# Patient Record
Sex: FEMALE | Race: WHITE | NOT HISPANIC OR LATINO
[De-identification: names, ages, dates, MRNs, and addresses within clinical notes are randomized per-mention and may not be internally consistent; named-entity substitution may affect disease eponyms.]

---

## 2017-01-23 ENCOUNTER — APPOINTMENT (OUTPATIENT)
Dept: OBGYN | Facility: CLINIC | Age: 27
End: 2017-01-23

## 2017-02-24 ENCOUNTER — ASOB RESULT (OUTPATIENT)
Age: 27
End: 2017-02-24

## 2017-02-24 ENCOUNTER — APPOINTMENT (OUTPATIENT)
Dept: ANTEPARTUM | Facility: CLINIC | Age: 27
End: 2017-02-24

## 2017-02-27 ENCOUNTER — APPOINTMENT (OUTPATIENT)
Dept: OBGYN | Facility: CLINIC | Age: 27
End: 2017-02-27

## 2017-03-21 ENCOUNTER — APPOINTMENT (OUTPATIENT)
Dept: OBGYN | Facility: CLINIC | Age: 27
End: 2017-03-21

## 2017-04-11 ENCOUNTER — APPOINTMENT (OUTPATIENT)
Dept: OBGYN | Facility: CLINIC | Age: 27
End: 2017-04-11

## 2017-05-02 ENCOUNTER — APPOINTMENT (OUTPATIENT)
Dept: OBGYN | Facility: CLINIC | Age: 27
End: 2017-05-02

## 2017-05-17 ENCOUNTER — APPOINTMENT (OUTPATIENT)
Dept: OBGYN | Facility: CLINIC | Age: 27
End: 2017-05-17

## 2017-05-30 ENCOUNTER — APPOINTMENT (OUTPATIENT)
Dept: OBGYN | Facility: CLINIC | Age: 27
End: 2017-05-30

## 2017-06-13 ENCOUNTER — APPOINTMENT (OUTPATIENT)
Dept: OBGYN | Facility: CLINIC | Age: 27
End: 2017-06-13

## 2017-06-20 ENCOUNTER — APPOINTMENT (OUTPATIENT)
Dept: OBGYN | Facility: CLINIC | Age: 27
End: 2017-06-20

## 2017-06-27 ENCOUNTER — APPOINTMENT (OUTPATIENT)
Dept: OBGYN | Facility: CLINIC | Age: 27
End: 2017-06-27

## 2017-07-03 ENCOUNTER — APPOINTMENT (OUTPATIENT)
Dept: OBGYN | Facility: CLINIC | Age: 27
End: 2017-07-03

## 2017-07-06 ENCOUNTER — OUTPATIENT (OUTPATIENT)
Dept: OUTPATIENT SERVICES | Facility: HOSPITAL | Age: 27
LOS: 1 days | End: 2017-07-06
Payer: COMMERCIAL

## 2017-07-06 DIAGNOSIS — O34.212 MATERNAL CARE FOR VERTICAL SCAR FROM PREVIOUS CESAREAN DELIVERY: ICD-10-CM

## 2017-07-06 DIAGNOSIS — Z01.818 ENCOUNTER FOR OTHER PREPROCEDURAL EXAMINATION: ICD-10-CM

## 2017-07-06 LAB
HCT VFR BLD CALC: 36.4 % — SIGNIFICANT CHANGE UP (ref 34.5–45)
HGB BLD-MCNC: 12.1 G/DL — SIGNIFICANT CHANGE UP (ref 11.5–15.5)
MCHC RBC-ENTMCNC: 29.7 PG — SIGNIFICANT CHANGE UP (ref 27–34)
MCHC RBC-ENTMCNC: 33.2 GM/DL — SIGNIFICANT CHANGE UP (ref 32–36)
MCV RBC AUTO: 89.4 FL — SIGNIFICANT CHANGE UP (ref 80–100)
PLATELET # BLD AUTO: 184 K/UL — SIGNIFICANT CHANGE UP (ref 150–400)
RBC # BLD: 4.07 M/UL — SIGNIFICANT CHANGE UP (ref 3.8–5.2)
RBC # FLD: 14.3 % — SIGNIFICANT CHANGE UP (ref 10.3–14.5)
WBC # BLD: 9.18 K/UL — SIGNIFICANT CHANGE UP (ref 3.8–10.5)
WBC # FLD AUTO: 9.18 K/UL — SIGNIFICANT CHANGE UP (ref 3.8–10.5)

## 2017-07-06 PROCEDURE — 86901 BLOOD TYPING SEROLOGIC RH(D): CPT

## 2017-07-06 PROCEDURE — 86900 BLOOD TYPING SEROLOGIC ABO: CPT

## 2017-07-06 PROCEDURE — G0463: CPT

## 2017-07-06 PROCEDURE — 85027 COMPLETE CBC AUTOMATED: CPT

## 2017-07-06 PROCEDURE — 86850 RBC ANTIBODY SCREEN: CPT

## 2017-07-07 ENCOUNTER — TRANSCRIPTION ENCOUNTER (OUTPATIENT)
Age: 27
End: 2017-07-07

## 2017-07-07 ENCOUNTER — INPATIENT (INPATIENT)
Facility: HOSPITAL | Age: 27
LOS: 2 days | Discharge: HOME | End: 2017-07-10
Attending: OBSTETRICS & GYNECOLOGY | Admitting: OBSTETRICS & GYNECOLOGY
Payer: COMMERCIAL

## 2017-07-07 VITALS
TEMPERATURE: 98 F | SYSTOLIC BLOOD PRESSURE: 106 MMHG | RESPIRATION RATE: 20 BRPM | HEART RATE: 90 BPM | OXYGEN SATURATION: 98 % | DIASTOLIC BLOOD PRESSURE: 69 MMHG

## 2017-07-07 DIAGNOSIS — O34.212 MATERNAL CARE FOR VERTICAL SCAR FROM PREVIOUS CESAREAN DELIVERY: ICD-10-CM

## 2017-07-07 LAB
BASOPHILS # BLD AUTO: 0 K/UL — SIGNIFICANT CHANGE UP (ref 0–0.2)
BASOPHILS # BLD AUTO: 0.1 K/UL — SIGNIFICANT CHANGE UP (ref 0–0.2)
BASOPHILS NFR BLD AUTO: 0.1 % — SIGNIFICANT CHANGE UP (ref 0–2)
BASOPHILS NFR BLD AUTO: 0.5 % — SIGNIFICANT CHANGE UP (ref 0–2)
EOSINOPHIL # BLD AUTO: 0 K/UL — SIGNIFICANT CHANGE UP (ref 0–0.5)
EOSINOPHIL # BLD AUTO: 0.1 K/UL — SIGNIFICANT CHANGE UP (ref 0–0.5)
EOSINOPHIL NFR BLD AUTO: 0.2 % — SIGNIFICANT CHANGE UP (ref 0–6)
EOSINOPHIL NFR BLD AUTO: 0.8 % — SIGNIFICANT CHANGE UP (ref 0–6)
HCT VFR BLD CALC: 34.1 % — LOW (ref 34.5–45)
HCT VFR BLD CALC: 35.1 % — SIGNIFICANT CHANGE UP (ref 34.5–45)
HGB BLD-MCNC: 11.8 G/DL — SIGNIFICANT CHANGE UP (ref 11.5–15.5)
HGB BLD-MCNC: 12.2 G/DL — SIGNIFICANT CHANGE UP (ref 11.5–15.5)
LYMPHOCYTES # BLD AUTO: 1.7 K/UL — SIGNIFICANT CHANGE UP (ref 1–3.3)
LYMPHOCYTES # BLD AUTO: 1.9 K/UL — SIGNIFICANT CHANGE UP (ref 1–3.3)
LYMPHOCYTES # BLD AUTO: 11.6 % — LOW (ref 13–44)
LYMPHOCYTES # BLD AUTO: 20.1 % — SIGNIFICANT CHANGE UP (ref 13–44)
MCHC RBC-ENTMCNC: 31.9 PG — SIGNIFICANT CHANGE UP (ref 27–34)
MCHC RBC-ENTMCNC: 31.9 PG — SIGNIFICANT CHANGE UP (ref 27–34)
MCHC RBC-ENTMCNC: 34.7 GM/DL — SIGNIFICANT CHANGE UP (ref 32–36)
MCHC RBC-ENTMCNC: 34.9 GM/DL — SIGNIFICANT CHANGE UP (ref 32–36)
MCV RBC AUTO: 91.5 FL — SIGNIFICANT CHANGE UP (ref 80–100)
MCV RBC AUTO: 92 FL — SIGNIFICANT CHANGE UP (ref 80–100)
MONOCYTES # BLD AUTO: 1 K/UL — HIGH (ref 0–0.9)
MONOCYTES # BLD AUTO: 1.1 K/UL — HIGH (ref 0–0.9)
MONOCYTES NFR BLD AUTO: 10.8 % — SIGNIFICANT CHANGE UP (ref 2–14)
MONOCYTES NFR BLD AUTO: 7.6 % — SIGNIFICANT CHANGE UP (ref 2–14)
NEUTROPHILS # BLD AUTO: 11.7 K/UL — HIGH (ref 1.8–7.4)
NEUTROPHILS # BLD AUTO: 6.4 K/UL — SIGNIFICANT CHANGE UP (ref 1.8–7.4)
NEUTROPHILS NFR BLD AUTO: 67.8 % — SIGNIFICANT CHANGE UP (ref 43–77)
NEUTROPHILS NFR BLD AUTO: 80.5 % — HIGH (ref 43–77)
PLATELET # BLD AUTO: 154 K/UL — SIGNIFICANT CHANGE UP (ref 150–400)
PLATELET # BLD AUTO: 173 K/UL — SIGNIFICANT CHANGE UP (ref 150–400)
RBC # BLD: 3.71 M/UL — LOW (ref 3.8–5.2)
RBC # BLD: 3.83 M/UL — SIGNIFICANT CHANGE UP (ref 3.8–5.2)
RBC # FLD: 12.9 % — SIGNIFICANT CHANGE UP (ref 10.3–14.5)
RBC # FLD: 13.1 % — SIGNIFICANT CHANGE UP (ref 10.3–14.5)
T PALLIDUM AB TITR SER: NEGATIVE — SIGNIFICANT CHANGE UP
WBC # BLD: 14.5 K/UL — HIGH (ref 3.8–10.5)
WBC # BLD: 9.4 K/UL — SIGNIFICANT CHANGE UP (ref 3.8–10.5)
WBC # FLD AUTO: 14.5 K/UL — HIGH (ref 3.8–10.5)
WBC # FLD AUTO: 9.4 K/UL — SIGNIFICANT CHANGE UP (ref 3.8–10.5)

## 2017-07-07 PROCEDURE — 59510 CESAREAN DELIVERY: CPT

## 2017-07-07 RX ORDER — OXYCODONE HYDROCHLORIDE 5 MG/1
5 TABLET ORAL EVERY 4 HOURS
Qty: 0 | Refills: 0 | Status: DISCONTINUED | OUTPATIENT
Start: 2017-07-07 | End: 2017-07-10

## 2017-07-07 RX ORDER — FERROUS SULFATE 325(65) MG
325 TABLET ORAL DAILY
Qty: 0 | Refills: 0 | Status: DISCONTINUED | OUTPATIENT
Start: 2017-07-07 | End: 2017-07-10

## 2017-07-07 RX ORDER — CEFAZOLIN SODIUM 1 G
2000 VIAL (EA) INJECTION ONCE
Qty: 0 | Refills: 0 | Status: COMPLETED | OUTPATIENT
Start: 2017-07-07 | End: 2017-07-07

## 2017-07-07 RX ORDER — DIPHENHYDRAMINE HCL 50 MG
25 CAPSULE ORAL EVERY 6 HOURS
Qty: 0 | Refills: 0 | Status: DISCONTINUED | OUTPATIENT
Start: 2017-07-07 | End: 2017-07-10

## 2017-07-07 RX ORDER — SODIUM CHLORIDE 9 MG/ML
1000 INJECTION, SOLUTION INTRAVENOUS
Qty: 0 | Refills: 0 | Status: DISCONTINUED | OUTPATIENT
Start: 2017-07-07 | End: 2017-07-07

## 2017-07-07 RX ORDER — DOCUSATE SODIUM 100 MG
100 CAPSULE ORAL
Qty: 0 | Refills: 0 | Status: DISCONTINUED | OUTPATIENT
Start: 2017-07-07 | End: 2017-07-10

## 2017-07-07 RX ORDER — SIMETHICONE 80 MG/1
80 TABLET, CHEWABLE ORAL EVERY 4 HOURS
Qty: 0 | Refills: 0 | Status: DISCONTINUED | OUTPATIENT
Start: 2017-07-07 | End: 2017-07-10

## 2017-07-07 RX ORDER — SODIUM CHLORIDE 9 MG/ML
1000 INJECTION, SOLUTION INTRAVENOUS
Qty: 0 | Refills: 0 | Status: DISCONTINUED | OUTPATIENT
Start: 2017-07-07 | End: 2017-07-10

## 2017-07-07 RX ORDER — OXYTOCIN 10 UNIT/ML
41.67 VIAL (ML) INJECTION
Qty: 20 | Refills: 0 | Status: DISCONTINUED | OUTPATIENT
Start: 2017-07-07 | End: 2017-07-10

## 2017-07-07 RX ORDER — METOCLOPRAMIDE HCL 10 MG
10 TABLET ORAL ONCE
Qty: 0 | Refills: 0 | Status: DISCONTINUED | OUTPATIENT
Start: 2017-07-07 | End: 2017-07-07

## 2017-07-07 RX ORDER — KETOROLAC TROMETHAMINE 30 MG/ML
30 SYRINGE (ML) INJECTION EVERY 6 HOURS
Qty: 0 | Refills: 0 | Status: DISCONTINUED | OUTPATIENT
Start: 2017-07-07 | End: 2017-07-10

## 2017-07-07 RX ORDER — HYDROMORPHONE HYDROCHLORIDE 2 MG/ML
0.5 INJECTION INTRAMUSCULAR; INTRAVENOUS; SUBCUTANEOUS
Qty: 0 | Refills: 0 | Status: DISCONTINUED | OUTPATIENT
Start: 2017-07-07 | End: 2017-07-07

## 2017-07-07 RX ORDER — GLYCERIN ADULT
1 SUPPOSITORY, RECTAL RECTAL AT BEDTIME
Qty: 0 | Refills: 0 | Status: DISCONTINUED | OUTPATIENT
Start: 2017-07-07 | End: 2017-07-10

## 2017-07-07 RX ORDER — HEPARIN SODIUM 5000 [USP'U]/ML
5000 INJECTION INTRAVENOUS; SUBCUTANEOUS EVERY 12 HOURS
Qty: 0 | Refills: 0 | Status: DISCONTINUED | OUTPATIENT
Start: 2017-07-07 | End: 2017-07-10

## 2017-07-07 RX ORDER — NALOXONE HYDROCHLORIDE 4 MG/.1ML
0.1 SPRAY NASAL
Qty: 0 | Refills: 0 | Status: DISCONTINUED | OUTPATIENT
Start: 2017-07-07 | End: 2017-07-09

## 2017-07-07 RX ORDER — CITRIC ACID/SODIUM CITRATE 300-500 MG
15 SOLUTION, ORAL ORAL ONCE
Qty: 0 | Refills: 0 | Status: COMPLETED | OUTPATIENT
Start: 2017-07-07 | End: 2017-07-07

## 2017-07-07 RX ORDER — FAMOTIDINE 10 MG/ML
20 INJECTION INTRAVENOUS ONCE
Qty: 0 | Refills: 0 | Status: COMPLETED | OUTPATIENT
Start: 2017-07-07 | End: 2017-07-07

## 2017-07-07 RX ORDER — OXYTOCIN 10 UNIT/ML
333.33 VIAL (ML) INJECTION
Qty: 20 | Refills: 0 | Status: DISCONTINUED | OUTPATIENT
Start: 2017-07-07 | End: 2017-07-10

## 2017-07-07 RX ORDER — LANOLIN
1 OINTMENT (GRAM) TOPICAL
Qty: 0 | Refills: 0 | Status: DISCONTINUED | OUTPATIENT
Start: 2017-07-07 | End: 2017-07-10

## 2017-07-07 RX ORDER — SODIUM CHLORIDE 9 MG/ML
1000 INJECTION, SOLUTION INTRAVENOUS ONCE
Qty: 0 | Refills: 0 | Status: COMPLETED | OUTPATIENT
Start: 2017-07-07 | End: 2017-07-07

## 2017-07-07 RX ORDER — IBUPROFEN 200 MG
600 TABLET ORAL EVERY 6 HOURS
Qty: 0 | Refills: 0 | Status: COMPLETED | OUTPATIENT
Start: 2017-07-07 | End: 2018-06-05

## 2017-07-07 RX ORDER — DEXAMETHASONE 0.5 MG/5ML
4 ELIXIR ORAL EVERY 6 HOURS
Qty: 0 | Refills: 0 | Status: DISCONTINUED | OUTPATIENT
Start: 2017-07-07 | End: 2017-07-09

## 2017-07-07 RX ORDER — TETANUS TOXOID, REDUCED DIPHTHERIA TOXOID AND ACELLULAR PERTUSSIS VACCINE, ADSORBED 5; 2.5; 8; 8; 2.5 [IU]/.5ML; [IU]/.5ML; UG/.5ML; UG/.5ML; UG/.5ML
0.5 SUSPENSION INTRAMUSCULAR ONCE
Qty: 0 | Refills: 0 | Status: COMPLETED | OUTPATIENT
Start: 2017-07-07

## 2017-07-07 RX ORDER — CITRIC ACID/SODIUM CITRATE 300-500 MG
30 SOLUTION, ORAL ORAL ONCE
Qty: 0 | Refills: 0 | Status: DISCONTINUED | OUTPATIENT
Start: 2017-07-07 | End: 2017-07-07

## 2017-07-07 RX ORDER — OXYCODONE HYDROCHLORIDE 5 MG/1
5 TABLET ORAL
Qty: 0 | Refills: 0 | Status: COMPLETED | OUTPATIENT
Start: 2017-07-07 | End: 2017-07-14

## 2017-07-07 RX ORDER — ONDANSETRON 8 MG/1
4 TABLET, FILM COATED ORAL EVERY 6 HOURS
Qty: 0 | Refills: 0 | Status: DISCONTINUED | OUTPATIENT
Start: 2017-07-07 | End: 2017-07-09

## 2017-07-07 RX ORDER — ACETAMINOPHEN 500 MG
975 TABLET ORAL EVERY 6 HOURS
Qty: 0 | Refills: 0 | Status: COMPLETED | OUTPATIENT
Start: 2017-07-07 | End: 2018-06-05

## 2017-07-07 RX ADMIN — Medication 15 MILLILITER(S): at 09:13

## 2017-07-07 RX ADMIN — Medication 30 MILLIGRAM(S): at 20:43

## 2017-07-07 RX ADMIN — SODIUM CHLORIDE 125 MILLILITER(S): 9 INJECTION, SOLUTION INTRAVENOUS at 20:47

## 2017-07-07 RX ADMIN — SODIUM CHLORIDE 125 MILLILITER(S): 9 INJECTION, SOLUTION INTRAVENOUS at 14:00

## 2017-07-07 RX ADMIN — FAMOTIDINE 20 MILLIGRAM(S): 10 INJECTION INTRAVENOUS at 09:12

## 2017-07-07 RX ADMIN — SODIUM CHLORIDE 2000 MILLILITER(S): 9 INJECTION, SOLUTION INTRAVENOUS at 09:13

## 2017-07-07 RX ADMIN — HEPARIN SODIUM 5000 UNIT(S): 5000 INJECTION INTRAVENOUS; SUBCUTANEOUS at 20:44

## 2017-07-07 RX ADMIN — Medication 30 MILLIGRAM(S): at 21:15

## 2017-07-08 LAB
HCT VFR BLD CALC: 30.1 % — LOW (ref 34.5–45)
HGB BLD-MCNC: 9.9 G/DL — LOW (ref 11.5–15.5)
MCHC RBC-ENTMCNC: 29 PG — SIGNIFICANT CHANGE UP (ref 27–34)
MCHC RBC-ENTMCNC: 32.9 GM/DL — SIGNIFICANT CHANGE UP (ref 32–36)
MCV RBC AUTO: 88.3 FL — SIGNIFICANT CHANGE UP (ref 80–100)
PLATELET # BLD AUTO: 166 K/UL — SIGNIFICANT CHANGE UP (ref 150–400)
RBC # BLD: 3.41 M/UL — LOW (ref 3.8–5.2)
RBC # FLD: 14.5 % — SIGNIFICANT CHANGE UP (ref 10.3–14.5)
WBC # BLD: 10.17 K/UL — SIGNIFICANT CHANGE UP (ref 3.8–10.5)
WBC # FLD AUTO: 10.17 K/UL — SIGNIFICANT CHANGE UP (ref 3.8–10.5)

## 2017-07-08 RX ORDER — ACETAMINOPHEN 500 MG
975 TABLET ORAL EVERY 6 HOURS
Qty: 0 | Refills: 0 | Status: DISCONTINUED | OUTPATIENT
Start: 2017-07-08 | End: 2017-07-10

## 2017-07-08 RX ADMIN — Medication 30 MILLIGRAM(S): at 18:42

## 2017-07-08 RX ADMIN — Medication 30 MILLIGRAM(S): at 05:15

## 2017-07-08 RX ADMIN — SIMETHICONE 80 MILLIGRAM(S): 80 TABLET, CHEWABLE ORAL at 01:59

## 2017-07-08 RX ADMIN — HEPARIN SODIUM 5000 UNIT(S): 5000 INJECTION INTRAVENOUS; SUBCUTANEOUS at 12:49

## 2017-07-08 RX ADMIN — Medication 30 MILLIGRAM(S): at 04:45

## 2017-07-08 RX ADMIN — SODIUM CHLORIDE 125 MILLILITER(S): 9 INJECTION, SOLUTION INTRAVENOUS at 04:45

## 2017-07-08 RX ADMIN — Medication 30 MILLIGRAM(S): at 13:00

## 2017-07-08 RX ADMIN — Medication 30 MILLIGRAM(S): at 19:15

## 2017-07-08 RX ADMIN — Medication 30 MILLIGRAM(S): at 13:30

## 2017-07-08 RX ADMIN — HEPARIN SODIUM 5000 UNIT(S): 5000 INJECTION INTRAVENOUS; SUBCUTANEOUS at 21:01

## 2017-07-08 RX ADMIN — Medication 1 TABLET(S): at 13:08

## 2017-07-08 RX ADMIN — Medication 325 MILLIGRAM(S): at 13:08

## 2017-07-08 NOTE — PROGRESS NOTE ADULT - SUBJECTIVE AND OBJECTIVE BOX
Day _1__ of Anesthesia Pain Management Service    SUBJECTIVE:  Pain Scale Score	At rest: ___2 	With Activity: ___ 	[  ] Refer to charted pain scores    THERAPY:  [ ] Epidural Bupivacaine 0.0625% and Hydromorphone 10 micrograms/mL  [ ] Epidural Ropivacaine 0.2% plain   [x ] Epidural Bupivacaine 0.01 % and Fentanyl 3 micrograms/mL  (OB)    Demand dose __3_ lockout ___15 (minutes) Continuous Rate 10___       MEDICATIONS  (STANDING):  fentaNYL (3 MICROgram(s)/mL) + BUpivacaine 0.01% in 0.9% Sodium Chloride PCEA 250 milliLiter(s) Epidural PCA Continuous  oxytocin Infusion 333.333 milliUNIT(s)/Min (1000 mL/Hr) IV Continuous <Continuous>  oxytocin Infusion 41.667 milliUNIT(s)/Min (125 mL/Hr) IV Continuous <Continuous>  lactated ringers. 1000 milliLiter(s) (125 mL/Hr) IV Continuous <Continuous>  diphtheria/tetanus/pertussis (acellular) Vaccine (ADAcel) 0.5 milliLiter(s) IntraMuscular once  oxytocin Infusion 41.667 milliUNIT(s)/Min (125 mL/Hr) IV Continuous <Continuous>  ferrous    sulfate 325 milliGRAM(s) Oral daily  prenatal multivitamin 1 Tablet(s) Oral daily  ketorolac   Injectable 30 milliGRAM(s) IV Push every 6 hours  oxyCODONE    IR 5 milliGRAM(s) Oral every 3 hours  ibuprofen  Tablet 600 milliGRAM(s) Oral every 6 hours  heparin  Injectable 5000 Unit(s) SubCutaneous every 12 hours  acetaminophen   Tablet. 975 milliGRAM(s) Oral every 6 hours    MEDICATIONS  (PRN):  fentaNYL (3 MICROgram(s)/mL) + BUpivacaine 0.01% in 0.9% Sodium Chloride PCEA Rescue Clinician Bolus 5 milliLiter(s) Epidural every 15 minutes PRN Moderate Pain (4 - 6)  naloxone Injectable 0.1 milliGRAM(s) IV Push every 3 minutes PRN For ANY of the following changes in patient status:  A. RR LESS THAN 10 breaths per minute, B. Oxygen saturation LESS THAN 90%, C. Sedation score of 6  ondansetron Injectable 4 milliGRAM(s) IV Push every 6 hours PRN Nausea  dexamethasone  Injectable 4 milliGRAM(s) IV Push every 6 hours PRN Nausea, IF ondansetron is ineffective after 30 - 60 minutes  simethicone 80 milliGRAM(s) Chew every 4 hours PRN Gas  diphenhydrAMINE   Capsule 25 milliGRAM(s) Oral every 6 hours PRN Itching  glycerin Suppository - Adult 1 Suppository(s) Rectal at bedtime PRN Constipation  docusate sodium 100 milliGRAM(s) Oral two times a day PRN Stool Softening  lanolin Ointment 1 Application(s) Topical every 3 hours PRN Sore Nipples  oxyCODONE    IR 5 milliGRAM(s) Oral every 4 hours PRN Severe Pain (7 - 10)      OBJECTIVE:    Assessment of Epidural Catheter Site: 	    [ x] Dressing intact	[x ] Site non-tender	[x ] Site without erythema, discharge, edema  [ x] Epidural tubing and connection checked	[x [ Gross neurological exam within normal limits  [ x] Catheter removed – tip intact		                          11.8   14.5  )-----------( 154      ( 07 Jul 2017 14:15 )             34.1     Vital Signs Last 24 Hrs  T(C): 36.8 (07-08-17 @ 02:01), Max: 36.8 (07-08-17 @ 02:01)  T(F): 98.3 (07-08-17 @ 02:01), Max: 98.3 (07-08-17 @ 02:01)  HR: 83 (07-08-17 @ 02:01) (63 - 92)  BP: 92/58 (07-08-17 @ 02:01) (92/58 - 109/59)  BP(mean): 75 (07-07-17 @ 22:00) (66 - 80)  RR: 18 (07-08-17 @ 02:01) (14 - 20)  SpO2: 98% (07-07-17 @ 22:00) (96% - 100%)      Sedation Score:	[x ] Alert	[ ] Drowsy	[ ] Arousable  [ ] Asleep  [ ] Unresponsive    Side Effects:	[x ] None	[ ] Nausea	[ ] Vomiting  [ ] Pruritus  		[ ] Weakness  [ ] Numbness  [ ] Other:    ASSESSMENT/ PLAN:    Therapy to  be:	[x ] Continue   [ ] Discontinued   [ ] Change to prn Analgesics    Documentation and Verification of current medications:  [ X ] Done	[ ] Not done, not eligible, reason:    Comments:

## 2017-07-08 NOTE — PROGRESS NOTE ADULT - SUBJECTIVE AND OBJECTIVE BOX
Patient seen and examined at bedside, no acute overnight events. No acute complaints, pain well controlled with PCEA. Patient is ambulating and tolerating regular diet. Has not yet passed flatus. Pt is breast feeding her baby.    Vital Signs Last 24 Hours  T(C): 36.8 (07-08-17 @ 06:38), Max: 36.8 (07-08-17 @ 02:01)  HR: 78 (07-08-17 @ 06:38) (63 - 92)  BP: 100/62 (07-08-17 @ 06:38) (92/58 - 109/59)  RR: 18 (07-08-17 @ 06:38) (14 - 20)  SpO2: 98% (07-07-17 @ 22:00) (96% - 100%)    I&O's Summary    07 Jul 2017 07:01  -  08 Jul 2017 07:00  --------------------------------------------------------  IN: 2970 mL / OUT: 4925 mL / NET: -1955 mL        Physical Exam:  General: NAD  Abdomen: Soft, non-tender, non-distended, fundus firm  Incision: Pfannenstiel incision CDI, subcuticular suture closure  Pelvic: Lochia wnl    Labs:    Blood Type: A Positive  RPR: Negative               11.8   14.5  )-----------( 154      ( 07-07 @ 14:15 )             34.1                12.2   9.4   )-----------( 173      ( 07-07 @ 09:14 )             35.1                12.1   9.18  )-----------( 184      ( 07-06 @ 16:13 )             36.4         MEDICATIONS  (STANDING):  fentaNYL (3 MICROgram(s)/mL) + BUpivacaine 0.01% in 0.9% Sodium Chloride PCEA 250 milliLiter(s) Epidural PCA Continuous  oxytocin Infusion 333.333 milliUNIT(s)/Min (1000 mL/Hr) IV Continuous <Continuous>  oxytocin Infusion 41.667 milliUNIT(s)/Min (125 mL/Hr) IV Continuous <Continuous>  lactated ringers. 1000 milliLiter(s) (125 mL/Hr) IV Continuous <Continuous>  diphtheria/tetanus/pertussis (acellular) Vaccine (ADAcel) 0.5 milliLiter(s) IntraMuscular once  oxytocin Infusion 41.667 milliUNIT(s)/Min (125 mL/Hr) IV Continuous <Continuous>  ferrous    sulfate 325 milliGRAM(s) Oral daily  prenatal multivitamin 1 Tablet(s) Oral daily  ketorolac   Injectable 30 milliGRAM(s) IV Push every 6 hours  oxyCODONE    IR 5 milliGRAM(s) Oral every 3 hours  ibuprofen  Tablet 600 milliGRAM(s) Oral every 6 hours  heparin  Injectable 5000 Unit(s) SubCutaneous every 12 hours  acetaminophen   Tablet. 975 milliGRAM(s) Oral every 6 hours    MEDICATIONS  (PRN):  fentaNYL (3 MICROgram(s)/mL) + BUpivacaine 0.01% in 0.9% Sodium Chloride PCEA Rescue Clinician Bolus 5 milliLiter(s) Epidural every 15 minutes PRN Moderate Pain (4 - 6)  naloxone Injectable 0.1 milliGRAM(s) IV Push every 3 minutes PRN For ANY of the following changes in patient status:  A. RR LESS THAN 10 breaths per minute, B. Oxygen saturation LESS THAN 90%, C. Sedation score of 6  ondansetron Injectable 4 milliGRAM(s) IV Push every 6 hours PRN Nausea  dexamethasone  Injectable 4 milliGRAM(s) IV Push every 6 hours PRN Nausea, IF ondansetron is ineffective after 30 - 60 minutes  simethicone 80 milliGRAM(s) Chew every 4 hours PRN Gas  diphenhydrAMINE   Capsule 25 milliGRAM(s) Oral every 6 hours PRN Itching  glycerin Suppository - Adult 1 Suppository(s) Rectal at bedtime PRN Constipation  docusate sodium 100 milliGRAM(s) Oral two times a day PRN Stool Softening  lanolin Ointment 1 Application(s) Topical every 3 hours PRN Sore Nipples  oxyCODONE    IR 5 milliGRAM(s) Oral every 4 hours PRN Severe Pain (7 - 10)

## 2017-07-08 NOTE — PROGRESS NOTE ADULT - PROBLEM SELECTOR PLAN 1
- Continue with PCEA  - Increase ambulation  - Continue regular diet  - Renew IV fluids  - Check CBC  - D/c Chambers  - Incision dressing removed

## 2017-07-09 RX ORDER — TETANUS TOXOID, REDUCED DIPHTHERIA TOXOID AND ACELLULAR PERTUSSIS VACCINE, ADSORBED 5; 2.5; 8; 8; 2.5 [IU]/.5ML; [IU]/.5ML; UG/.5ML; UG/.5ML; UG/.5ML
0.5 SUSPENSION INTRAMUSCULAR ONCE
Qty: 0 | Refills: 0 | Status: COMPLETED | OUTPATIENT
Start: 2017-07-09 | End: 2017-07-09

## 2017-07-09 RX ORDER — IBUPROFEN 200 MG
600 TABLET ORAL EVERY 6 HOURS
Qty: 0 | Refills: 0 | Status: DISCONTINUED | OUTPATIENT
Start: 2017-07-09 | End: 2017-07-10

## 2017-07-09 RX ORDER — OXYCODONE HYDROCHLORIDE 5 MG/1
5 TABLET ORAL
Qty: 0 | Refills: 0 | Status: DISCONTINUED | OUTPATIENT
Start: 2017-07-09 | End: 2017-07-10

## 2017-07-09 RX ADMIN — HEPARIN SODIUM 5000 UNIT(S): 5000 INJECTION INTRAVENOUS; SUBCUTANEOUS at 10:48

## 2017-07-09 RX ADMIN — Medication 600 MILLIGRAM(S): at 14:00

## 2017-07-09 RX ADMIN — Medication 30 MILLIGRAM(S): at 06:50

## 2017-07-09 RX ADMIN — HEPARIN SODIUM 5000 UNIT(S): 5000 INJECTION INTRAVENOUS; SUBCUTANEOUS at 22:37

## 2017-07-09 RX ADMIN — Medication 975 MILLIGRAM(S): at 18:01

## 2017-07-09 RX ADMIN — Medication 600 MILLIGRAM(S): at 13:30

## 2017-07-09 RX ADMIN — Medication 975 MILLIGRAM(S): at 13:03

## 2017-07-09 RX ADMIN — SODIUM CHLORIDE 125 MILLILITER(S): 9 INJECTION, SOLUTION INTRAVENOUS at 00:29

## 2017-07-09 RX ADMIN — Medication 1 TABLET(S): at 13:03

## 2017-07-09 RX ADMIN — Medication 975 MILLIGRAM(S): at 13:26

## 2017-07-09 RX ADMIN — Medication 600 MILLIGRAM(S): at 23:15

## 2017-07-09 RX ADMIN — Medication 30 MILLIGRAM(S): at 06:18

## 2017-07-09 RX ADMIN — Medication 600 MILLIGRAM(S): at 22:40

## 2017-07-09 RX ADMIN — Medication 325 MILLIGRAM(S): at 13:03

## 2017-07-09 RX ADMIN — Medication 30 MILLIGRAM(S): at 01:00

## 2017-07-09 RX ADMIN — Medication 975 MILLIGRAM(S): at 18:19

## 2017-07-09 RX ADMIN — Medication 30 MILLIGRAM(S): at 00:28

## 2017-07-09 RX ADMIN — TETANUS TOXOID, REDUCED DIPHTHERIA TOXOID AND ACELLULAR PERTUSSIS VACCINE, ADSORBED 0.5 MILLILITER(S): 5; 2.5; 8; 8; 2.5 SUSPENSION INTRAMUSCULAR at 20:17

## 2017-07-09 NOTE — PROGRESS NOTE ADULT - ATTENDING COMMENTS
Pt seen and examined. Agree with above note. POD#2 s/p CS, doing well. Routine post op/PP care.     RENÉE Rodrigez

## 2017-07-09 NOTE — PROGRESS NOTE ADULT - PROBLEM SELECTOR PLAN 1
- increase out of bed and ambulation  - analgesia PRN  - d/c PCEA, advance to PO pain meds w tylenol/motrin/oxycodone  - continue regular diet    Gianna Khan, PGY1

## 2017-07-09 NOTE — PROGRESS NOTE ADULT - SUBJECTIVE AND OBJECTIVE BOX
Postpartum Note,  Section  She is a 27y woman who is now post-operative day: 2    Subjective:  The patient feels well.  She is ambulating.   She is tolerating regular diet.  She denies nausea and vomiting.  She is voiding.  Her pain is controlled.  She reports normal postpartum bleeding.  She is breastfeeding.    Physical exam:    Vital Signs Last 24 Hrs  T(C): 36.7 (2017 05:34), Max: 37 (2017 21:08)  T(F): 98 (2017 05:34), Max: 98.6 (2017 21:08)  HR: 91 (2017 05:34) (66 - 102)  BP: 101/65 (2017 05:34) (99/63 - 126/77)  BP(mean): --  RR: 18 (2017 05:34) (18 - 18)  SpO2: 97% (2017 13:00) (97% - 98%)    Gen: NAD  Breast: Soft, nontender, not engorged.  Abdomen: Soft, nontender, no distension , firm uterine fundus at umbilicus.  Incision: Clean, dry, and intact with steri strips  Pelvic: Normal lochia noted  Ext: No calf tenderness    LABS:                        9.9    10.17 )-----------( 166      ( 2017 08:35 )             30.1                         11.8   14.5  )-----------( 154      ( 2017 14:15 )             34.1                         12.2   9.4   )-----------( 173      ( 2017 09:14 )             35.1         Allergies    No Known Allergies    Intolerances      MEDICATIONS  (STANDING):  oxytocin Infusion 333.333 milliUNIT(s)/Min (1000 mL/Hr) IV Continuous <Continuous>  oxytocin Infusion 41.667 milliUNIT(s)/Min (125 mL/Hr) IV Continuous <Continuous>  lactated ringers. 1000 milliLiter(s) (125 mL/Hr) IV Continuous <Continuous>  diphtheria/tetanus/pertussis (acellular) Vaccine (ADAcel) 0.5 milliLiter(s) IntraMuscular once  oxytocin Infusion 41.667 milliUNIT(s)/Min (125 mL/Hr) IV Continuous <Continuous>  ferrous    sulfate 325 milliGRAM(s) Oral daily  prenatal multivitamin 1 Tablet(s) Oral daily  ketorolac   Injectable 30 milliGRAM(s) IV Push every 6 hours  oxyCODONE    IR 5 milliGRAM(s) Oral every 3 hours  ibuprofen  Tablet 600 milliGRAM(s) Oral every 6 hours  heparin  Injectable 5000 Unit(s) SubCutaneous every 12 hours  acetaminophen   Tablet. 975 milliGRAM(s) Oral every 6 hours    MEDICATIONS  (PRN):  simethicone 80 milliGRAM(s) Chew every 4 hours PRN Gas  diphenhydrAMINE   Capsule 25 milliGRAM(s) Oral every 6 hours PRN Itching  glycerin Suppository - Adult 1 Suppository(s) Rectal at bedtime PRN Constipation  docusate sodium 100 milliGRAM(s) Oral two times a day PRN Stool Softening  lanolin Ointment 1 Application(s) Topical every 3 hours PRN Sore Nipples  oxyCODONE    IR 5 milliGRAM(s) Oral every 4 hours PRN Severe Pain (7 - 10)

## 2017-07-09 NOTE — PROGRESS NOTE ADULT - SUBJECTIVE AND OBJECTIVE BOX
Day 2___ of Anesthesia Pain Management Service    SUBJECTIVE:  Pain Scale Score	At rest: _2__ 	With Activity: ___ 	[  ] Refer to charted pain scores    THERAPY:  [ ] Epidural Bupivacaine 0.0625% and Hydromorphone 10 micrograms/mL  [ ] Epidural Ropivacaine 0.2% plain   [ x] Epidural Bupivacaine 0.01 % and Fentanyl 3 micrograms/mL  (OB)    Demand dose __3_ lockout __15_ (minutes) Continuous Rate _10__       MEDICATIONS  (STANDING):  fentaNYL (3 MICROgram(s)/mL) + BUpivacaine 0.01% in 0.9% Sodium Chloride PCEA 250 milliLiter(s) Epidural PCA Continuous  oxytocin Infusion 333.333 milliUNIT(s)/Min (1000 mL/Hr) IV Continuous <Continuous>  oxytocin Infusion 41.667 milliUNIT(s)/Min (125 mL/Hr) IV Continuous <Continuous>  lactated ringers. 1000 milliLiter(s) (125 mL/Hr) IV Continuous <Continuous>  diphtheria/tetanus/pertussis (acellular) Vaccine (ADAcel) 0.5 milliLiter(s) IntraMuscular once  oxytocin Infusion 41.667 milliUNIT(s)/Min (125 mL/Hr) IV Continuous <Continuous>  ferrous    sulfate 325 milliGRAM(s) Oral daily  prenatal multivitamin 1 Tablet(s) Oral daily  ketorolac   Injectable 30 milliGRAM(s) IV Push every 6 hours  oxyCODONE    IR 5 milliGRAM(s) Oral every 3 hours  ibuprofen  Tablet 600 milliGRAM(s) Oral every 6 hours  heparin  Injectable 5000 Unit(s) SubCutaneous every 12 hours  acetaminophen   Tablet. 975 milliGRAM(s) Oral every 6 hours    MEDICATIONS  (PRN):  fentaNYL (3 MICROgram(s)/mL) + BUpivacaine 0.01% in 0.9% Sodium Chloride PCEA Rescue Clinician Bolus 5 milliLiter(s) Epidural every 15 minutes PRN Moderate Pain (4 - 6)  naloxone Injectable 0.1 milliGRAM(s) IV Push every 3 minutes PRN For ANY of the following changes in patient status:  A. RR LESS THAN 10 breaths per minute, B. Oxygen saturation LESS THAN 90%, C. Sedation score of 6  ondansetron Injectable 4 milliGRAM(s) IV Push every 6 hours PRN Nausea  dexamethasone  Injectable 4 milliGRAM(s) IV Push every 6 hours PRN Nausea, IF ondansetron is ineffective after 30 - 60 minutes  simethicone 80 milliGRAM(s) Chew every 4 hours PRN Gas  diphenhydrAMINE   Capsule 25 milliGRAM(s) Oral every 6 hours PRN Itching  glycerin Suppository - Adult 1 Suppository(s) Rectal at bedtime PRN Constipation  docusate sodium 100 milliGRAM(s) Oral two times a day PRN Stool Softening  lanolin Ointment 1 Application(s) Topical every 3 hours PRN Sore Nipples  oxyCODONE    IR 5 milliGRAM(s) Oral every 4 hours PRN Severe Pain (7 - 10)      OBJECTIVE:    Assessment of Epidural Catheter Site: 	    [x ] Dressing intact	[x ] Site non-tender	[x ] Site without erythema, discharge, edema  [x ] Epidural tubing and connection checked	[x [ Gross neurological exam within normal limits  [ x] Catheter removed – tip intact		                          9.9    10.17 )-----------( 166      ( 08 Jul 2017 08:35 )             30.1     Vital Signs Last 24 Hrs  T(C): 36.7 (07-09-17 @ 05:34), Max: 37 (07-08-17 @ 21:08)  T(F): 98 (07-09-17 @ 05:34), Max: 98.6 (07-08-17 @ 21:08)  HR: 91 (07-09-17 @ 05:34) (66 - 102)  BP: 101/65 (07-09-17 @ 05:34) (99/63 - 126/77)  BP(mean): --  RR: 18 (07-09-17 @ 05:34) (18 - 18)  SpO2: 97% (07-08-17 @ 13:00) (97% - 98%)      Sedation Score:	[x ] Alert	[ ] Drowsy	[ ] Arousable  [ ] Asleep  [ ] Unresponsive    Side Effects:	[x ] None	[ ] Nausea	[ ] Vomiting  [ ] Pruritus  		[ ] Weakness  [ ] Numbness  [ ] Other:    ASSESSMENT/ PLAN:    Therapy to  be:	[ ] Continue   [ ] Discontinued   [x ] Change to prn Analgesics    Documentation and Verification of current medications:  [ X ] Done	[ ] Not done, not eligible, reason:    Comments:

## 2017-07-10 ENCOUNTER — TRANSCRIPTION ENCOUNTER (OUTPATIENT)
Age: 27
End: 2017-07-10

## 2017-07-10 VITALS
SYSTOLIC BLOOD PRESSURE: 104 MMHG | DIASTOLIC BLOOD PRESSURE: 70 MMHG | HEART RATE: 84 BPM | RESPIRATION RATE: 18 BRPM | TEMPERATURE: 98 F

## 2017-07-10 LAB
BASOPHILS # BLD AUTO: 0.02 K/UL — SIGNIFICANT CHANGE UP (ref 0–0.2)
BASOPHILS NFR BLD AUTO: 0.3 % — SIGNIFICANT CHANGE UP (ref 0–2)
EOSINOPHIL # BLD AUTO: 0.19 K/UL — SIGNIFICANT CHANGE UP (ref 0–0.5)
EOSINOPHIL NFR BLD AUTO: 2.4 % — SIGNIFICANT CHANGE UP (ref 0–6)
HCT VFR BLD CALC: 29.9 % — LOW (ref 34.5–45)
HGB BLD-MCNC: 10 G/DL — LOW (ref 11.5–15.5)
IMM GRANULOCYTES NFR BLD AUTO: 0.4 % — SIGNIFICANT CHANGE UP (ref 0–1.5)
LYMPHOCYTES # BLD AUTO: 2.39 K/UL — SIGNIFICANT CHANGE UP (ref 1–3.3)
LYMPHOCYTES # BLD AUTO: 30.7 % — SIGNIFICANT CHANGE UP (ref 13–44)
MCHC RBC-ENTMCNC: 29.9 PG — SIGNIFICANT CHANGE UP (ref 27–34)
MCHC RBC-ENTMCNC: 33.4 GM/DL — SIGNIFICANT CHANGE UP (ref 32–36)
MCV RBC AUTO: 89.3 FL — SIGNIFICANT CHANGE UP (ref 80–100)
MONOCYTES # BLD AUTO: 0.78 K/UL — SIGNIFICANT CHANGE UP (ref 0–0.9)
MONOCYTES NFR BLD AUTO: 10 % — SIGNIFICANT CHANGE UP (ref 2–14)
NEUTROPHILS # BLD AUTO: 4.38 K/UL — SIGNIFICANT CHANGE UP (ref 1.8–7.4)
NEUTROPHILS NFR BLD AUTO: 56.2 % — SIGNIFICANT CHANGE UP (ref 43–77)
PLATELET # BLD AUTO: 175 K/UL — SIGNIFICANT CHANGE UP (ref 150–400)
RBC # BLD: 3.35 M/UL — LOW (ref 3.8–5.2)
RBC # FLD: 14.3 % — SIGNIFICANT CHANGE UP (ref 10.3–14.5)
WBC # BLD: 7.79 K/UL — SIGNIFICANT CHANGE UP (ref 3.8–10.5)
WBC # FLD AUTO: 7.79 K/UL — SIGNIFICANT CHANGE UP (ref 3.8–10.5)

## 2017-07-10 PROCEDURE — 86780 TREPONEMA PALLIDUM: CPT

## 2017-07-10 PROCEDURE — 85027 COMPLETE CBC AUTOMATED: CPT

## 2017-07-10 PROCEDURE — 59050 FETAL MONITOR W/REPORT: CPT

## 2017-07-10 PROCEDURE — 59025 FETAL NON-STRESS TEST: CPT

## 2017-07-10 PROCEDURE — 90715 TDAP VACCINE 7 YRS/> IM: CPT

## 2017-07-10 RX ORDER — DOCUSATE SODIUM 100 MG
1 CAPSULE ORAL
Qty: 0 | Refills: 0 | DISCHARGE
Start: 2017-07-10

## 2017-07-10 RX ORDER — IBUPROFEN 200 MG
1 TABLET ORAL
Qty: 0 | Refills: 0 | DISCHARGE
Start: 2017-07-10

## 2017-07-10 RX ORDER — ACETAMINOPHEN 500 MG
3 TABLET ORAL
Qty: 0 | Refills: 0 | DISCHARGE
Start: 2017-07-10

## 2017-07-10 RX ORDER — SIMETHICONE 80 MG/1
1 TABLET, CHEWABLE ORAL
Qty: 0 | Refills: 0 | COMMUNITY
Start: 2017-07-10

## 2017-07-10 NOTE — DISCHARGE NOTE OB - MATERIALS PROVIDED
[facility] Hearing Screen Program/  Immunization Record/Breastfeeding Guide and Packet/Birth Certificate Instructions/Shaken Baby Prevention Handout/[facility]  Screening Program/Vaccinations/Breastfeeding Mother’s Support Group Information/Guide to Postpartum Care/Breastfeeding Log/Bottle Feeding Log/Discharge Medication Information for Patients and Families Pocket Guide/Back To Sleep Handout

## 2017-07-10 NOTE — DISCHARGE NOTE OB - MEDICATION SUMMARY - MEDICATIONS TO TAKE
I will START or STAY ON the medications listed below when I get home from the hospital:    acetaminophen 325 mg oral tablet  -- 3 tab(s) by mouth every 6 hours  -- Indication: For mild pain    ibuprofen 600 mg oral tablet  -- 1 tab(s) by mouth every 6 hours  -- Indication: For moderate pain    Prenatal Multivitamins oral capsule  --  by mouth   -- Indication: For nutrition    docusate sodium 100 mg oral capsule  -- 1 cap(s) by mouth 2 times a day, As needed, Stool Softening  -- Indication: For stool softener    simethicone 80 mg oral tablet, chewable  -- 1 tab(s) by mouth every 4 hours, As needed, Gas  -- Indication: For gas pain

## 2017-07-10 NOTE — DISCHARGE NOTE OB - CARE PROVIDER_API CALL
Ayah Uriostegui (MD), OBSGYN  [address] [place] [address]  Phone: [phone]  Fax: [phone]

## 2017-07-10 NOTE — PROGRESS NOTE ADULT - SUBJECTIVE AND OBJECTIVE BOX
Postpartum Note,  Section  She is a  27y woman who is now post-operative day: 3    Subjective:  The patient feels well.  She is ambulating.   She is tolerating regular diet.  She denies nausea and vomiting.  She is voiding, having bowel movements.  Her pain is controlled.  She reports normal postpartum bleeding.  She is breastfeeding.      Physical exam:    Vital Signs Last 24 Hrs  T(C): 36.7 (10 Jul 2017 06:08), Max: 36.7 (10 Jul 2017 06:08)  T(F): 98.1 (10 Jul 2017 06:08), Max: 98.1 (10 Jul 2017 06:08)  HR: 84 (10 Jul 2017 06:08) (77 - 87)  BP: 104/70 (10 Jul 2017 06:08) (104/70 - 118/73)  BP(mean): --  RR: 18 (10 Jul 2017 06:08) (18 - 18)  SpO2: --    Gen: NAD  Breast: Soft, nontender, not engorged.  Abdomen: Soft, nontender, no distension , firm uterine fundus at umbilicus.  Incision: Clean, dry, and intact with steri strips  Pelvic: Normal lochia noted  Ext: No calf tenderness    LABS:                        10.0   7.79  )-----------( 175      ( 10 Jul 2017 07:12 )             29.9                         9.9    10.17 )-----------( 166      ( 2017 08:35 )             30.1               Allergies    No Known Allergies    Intolerances      MEDICATIONS  (STANDING):  oxytocin Infusion 333.333 milliUNIT(s)/Min (1000 mL/Hr) IV Continuous <Continuous>  oxytocin Infusion 41.667 milliUNIT(s)/Min (125 mL/Hr) IV Continuous <Continuous>  lactated ringers. 1000 milliLiter(s) (125 mL/Hr) IV Continuous <Continuous>  oxytocin Infusion 41.667 milliUNIT(s)/Min (125 mL/Hr) IV Continuous <Continuous>  ferrous    sulfate 325 milliGRAM(s) Oral daily  prenatal multivitamin 1 Tablet(s) Oral daily  ketorolac   Injectable 30 milliGRAM(s) IV Push every 6 hours  heparin  Injectable 5000 Unit(s) SubCutaneous every 12 hours  acetaminophen   Tablet. 975 milliGRAM(s) Oral every 6 hours  ibuprofen  Tablet 600 milliGRAM(s) Oral every 6 hours  oxyCODONE    IR 5 milliGRAM(s) Oral every 3 hours    MEDICATIONS  (PRN):  simethicone 80 milliGRAM(s) Chew every 4 hours PRN Gas  diphenhydrAMINE   Capsule 25 milliGRAM(s) Oral every 6 hours PRN Itching  glycerin Suppository - Adult 1 Suppository(s) Rectal at bedtime PRN Constipation  docusate sodium 100 milliGRAM(s) Oral two times a day PRN Stool Softening  lanolin Ointment 1 Application(s) Topical every 3 hours PRN Sore Nipples  oxyCODONE    IR 5 milliGRAM(s) Oral every 4 hours PRN Severe Pain (7 - 10)

## 2017-07-10 NOTE — DISCHARGE NOTE OB - CARE PLAN
Principal Discharge DX:	 delivery delivered  Goal:	recover from surgery  Instructions for follow-up, activity and diet:	no heavy lifting or strenuous exercise, nothing the in the vagina, follow up in office at scheduled appointment in 2 weeks

## 2017-07-10 NOTE — DISCHARGE NOTE OB - PATIENT PORTAL LINK FT
“You can access the [employer] Patient Portal, offered by [facility], by registering with the following website: [email or web address]”

## 2017-07-10 NOTE — PROGRESS NOTE ADULT - PROBLEM SELECTOR PLAN 1
- increase out of bed and ambulation  - analgesia prn  - PO pain meds w tylenol/motrin/oxycodone  - continue regular diet  - CBC wnl  - discharge planned for today    Gianna Khan, PGY1

## 2017-07-10 NOTE — PROGRESS NOTE ADULT - ATTENDING COMMENTS
28yo P2 s/p repeat LTCS POD3, I have seen and examined the patient and agree with above assessment and plan, discharge home today

## 2017-07-15 ENCOUNTER — EMERGENCY (EMERGENCY)
Facility: HOSPITAL | Age: 27
LOS: 1 days | Discharge: HOME | End: 2017-07-15
Attending: EMERGENCY MEDICINE
Payer: COMMERCIAL

## 2017-07-15 VITALS
DIASTOLIC BLOOD PRESSURE: 83 MMHG | HEART RATE: 74 BPM | SYSTOLIC BLOOD PRESSURE: 113 MMHG | RESPIRATION RATE: 18 BRPM | OXYGEN SATURATION: 98 % | TEMPERATURE: 98 F

## 2017-07-15 VITALS
DIASTOLIC BLOOD PRESSURE: 76 MMHG | TEMPERATURE: 98 F | RESPIRATION RATE: 18 BRPM | HEART RATE: 73 BPM | SYSTOLIC BLOOD PRESSURE: 119 MMHG | OXYGEN SATURATION: 100 %

## 2017-07-15 DIAGNOSIS — Z98.891 HISTORY OF UTERINE SCAR FROM PREVIOUS SURGERY: Chronic | ICD-10-CM

## 2017-07-15 PROCEDURE — 99284 EMERGENCY DEPT VISIT MOD MDM: CPT | Mod: 25

## 2017-07-15 NOTE — ED PROVIDER NOTE - ATTENDING CONTRIBUTION TO CARE
Attending MD Huff:  I personally have seen and examined this patient.  Resident note reviewed and agree on plan of care and except where noted.  See MDM for details.

## 2017-07-15 NOTE — ED PROVIDER NOTE - NS ED ROS FT
Constitutional: No fever or chills  Eyes: No visual changes  HEENT: No throat pain  CV: No chest pain  Resp: No SOB no cough  GI: No abd pain, nausea or vomiting  : No dysuria  MSK: No musculoskeletal pain  Skin: As per hpi  Neuro: No headache

## 2017-07-15 NOTE — ED PROVIDER NOTE - OBJECTIVE STATEMENT
27F with no past medical history presents with painful mass in L inguinal area 7d status post .  Patient had normal pregnancy and  delivery (OBGYN Dr. Alyson Gutierrez) with normal post op course.  Yesterday, developed burning, 1x1cm subcutaneous mass and is concerned for clot in L inguinal area.  Denies CP, shortness of breath, h/o vte, abdominal pain, surgical scar pain/dc/bleeding/dehiscence, fever, chills, vaginal pain, vaginal bleeding, vaginal discharge.

## 2017-07-15 NOTE — ED PROVIDER NOTE - PHYSICAL EXAMINATION
***GEN - well appearing; NAD   ***HEAD - NC/AT  ***EYES/NOSE - PEERL, EOMI, mucous membranes moist, no discharge   ***THROAT: Oral cavity and pharynx normal. No inflammation, swelling, exudate, or lesions.    ***NECK: supple, non-tender no lymphadenopathy  ***PULMONARY - CTA b/l, symmetric breath sounds.   ***CARDIAC- s1s2, RRR, no murmur  ***ABDOMEN - +BS, ND, NT, soft, no guarding, no rebound, no organomegaly  ***BACK - no CVA tenderness, Normal  spine   ***EXTREMITIES - symmetric pulses, 2+ dp, capillary refill < 2 seconds, no clubbing, no cyanosis, no edema   ***SKIN - warm, dry, intact, no rash or bruising.  1x1cm firm subcutaneous mass in L inguinal area with mild TTP, no overlying erythema, warmth, ecchymosis, pulse  ***NEUROLOGIC - a&o x3, CN 2-12 intact, sensation nl, motor 5/5 RUE/LUE/RLE/LLE gait nl,

## 2017-07-15 NOTE — ED PROVIDER NOTE - CARE PLAN
Principal Discharge DX:	Superficial vein thrombosis  Instructions for follow-up, activity and diet:	1) Take currently prescribed medications as directed.    2) Follow up with your primary doctor and OBGYN for further evaluation and to answer any questions you have.    3) Return to the emergency department if you experience worsening symptoms, pain, fever, chills, nausea, vomiting or other concerning symptoms.

## 2017-07-15 NOTE — ED ADULT NURSE NOTE - OBJECTIVE STATEMENT
27 y.o F arrived to ED c/o painful mass in L inguinal region. A&Ox3. pt had  7 days ago. ; pregnancy and  uncomplicated, has been recovering well. Denies vaginal bleed/discharge/ pain. No pain/burning upon urination. Respirations nonlabored, O2 sat 100% RA; neuro intact, moves all extremities. Denies CP, SOB, N/V/D, fevers, chills, HA, dizziness, vision changes. Safety and comfort provided/maintained.

## 2017-07-15 NOTE — ED ADULT NURSE NOTE - DISCHARGE TEACHING
Pt instructed to followup with PMD in 24-48 hours; instructed to followup with OBGYN; verbalized understanding.

## 2017-07-15 NOTE — ED PROVIDER NOTE - PLAN OF CARE
1) Take currently prescribed medications as directed.    2) Follow up with your primary doctor and OBGYN for further evaluation and to answer any questions you have.    3) Return to the emergency department if you experience worsening symptoms, pain, fever, chills, nausea, vomiting or other concerning symptoms.

## 2017-07-15 NOTE — ED PROVIDER NOTE - MEDICAL DECISION MAKING DETAILS
Will obtain POCUS to evaluate for superificial thrombus Will obtain POCUS to evaluate for superificial thrombus    Daria GOMEZ: 28 y/o female one month post partum in the ER after noticing a small mass in her L inguinal area associated with a small superficial vein. On exam mass is nontender and mobile and not firm. POCUS was used to show that the mass has venous flow and is compressible and most likely represents thrombophlebitis. Patient educated and reassured and asked to follow up with PMD in 5 days.

## 2017-07-21 ENCOUNTER — APPOINTMENT (OUTPATIENT)
Dept: OBGYN | Facility: CLINIC | Age: 27
End: 2017-07-21

## 2017-08-15 ENCOUNTER — APPOINTMENT (OUTPATIENT)
Dept: OBGYN | Facility: CLINIC | Age: 27
End: 2017-08-15
Payer: COMMERCIAL

## 2017-08-15 PROCEDURE — 0503F POSTPARTUM CARE VISIT: CPT

## 2017-10-09 ENCOUNTER — APPOINTMENT (OUTPATIENT)
Dept: OBGYN | Facility: CLINIC | Age: 27
End: 2017-10-09
Payer: COMMERCIAL

## 2017-10-09 ENCOUNTER — RESULT REVIEW (OUTPATIENT)
Age: 27
End: 2017-10-09

## 2017-10-09 PROCEDURE — 36415 COLL VENOUS BLD VENIPUNCTURE: CPT

## 2017-10-09 PROCEDURE — 99395 PREV VISIT EST AGE 18-39: CPT

## 2018-03-27 ENCOUNTER — TRANSCRIPTION ENCOUNTER (OUTPATIENT)
Age: 28
End: 2018-03-27

## 2018-05-16 ENCOUNTER — TRANSCRIPTION ENCOUNTER (OUTPATIENT)
Age: 28
End: 2018-05-16

## 2018-05-31 ENCOUNTER — APPOINTMENT (OUTPATIENT)
Dept: DERMATOLOGY | Facility: CLINIC | Age: 28
End: 2018-05-31
Payer: COMMERCIAL

## 2018-05-31 VITALS
BODY MASS INDEX: 29.99 KG/M2 | DIASTOLIC BLOOD PRESSURE: 72 MMHG | WEIGHT: 180 LBS | HEIGHT: 65 IN | SYSTOLIC BLOOD PRESSURE: 120 MMHG

## 2018-05-31 DIAGNOSIS — Z86.69 PERSONAL HISTORY OF OTHER DISEASES OF THE NERVOUS SYSTEM AND SENSE ORGANS: ICD-10-CM

## 2018-05-31 DIAGNOSIS — Z91.89 OTHER SPECIFIED PERSONAL RISK FACTORS, NOT ELSEWHERE CLASSIFIED: ICD-10-CM

## 2018-05-31 DIAGNOSIS — D22.9 MELANOCYTIC NEVI, UNSPECIFIED: ICD-10-CM

## 2018-05-31 PROCEDURE — 99203 OFFICE O/P NEW LOW 30 MIN: CPT

## 2018-06-12 ENCOUNTER — APPOINTMENT (OUTPATIENT)
Dept: DERMATOLOGY | Facility: CLINIC | Age: 28
End: 2018-06-12

## 2018-10-12 ENCOUNTER — TRANSCRIPTION ENCOUNTER (OUTPATIENT)
Age: 28
End: 2018-10-12

## 2018-11-13 ENCOUNTER — RESULT REVIEW (OUTPATIENT)
Age: 28
End: 2018-11-13

## 2018-11-13 ENCOUNTER — APPOINTMENT (OUTPATIENT)
Dept: OBGYN | Facility: CLINIC | Age: 28
End: 2018-11-13
Payer: COMMERCIAL

## 2018-11-13 PROCEDURE — 90471 IMMUNIZATION ADMIN: CPT

## 2018-11-13 PROCEDURE — 99213 OFFICE O/P EST LOW 20 MIN: CPT | Mod: 25

## 2018-11-13 PROCEDURE — 36415 COLL VENOUS BLD VENIPUNCTURE: CPT

## 2018-11-13 PROCEDURE — 81025 URINE PREGNANCY TEST: CPT

## 2018-11-13 PROCEDURE — 76817 TRANSVAGINAL US OBSTETRIC: CPT

## 2018-11-13 PROCEDURE — 90686 IIV4 VACC NO PRSV 0.5 ML IM: CPT

## 2018-12-05 ENCOUNTER — APPOINTMENT (OUTPATIENT)
Dept: OBGYN | Facility: CLINIC | Age: 28
End: 2018-12-05
Payer: COMMERCIAL

## 2018-12-05 PROCEDURE — 76817 TRANSVAGINAL US OBSTETRIC: CPT

## 2018-12-05 PROCEDURE — 0500F INITIAL PRENATAL CARE VISIT: CPT

## 2018-12-17 ENCOUNTER — EMERGENCY (EMERGENCY)
Facility: HOSPITAL | Age: 28
LOS: 1 days | Discharge: HOME | End: 2018-12-17
Attending: EMERGENCY MEDICINE | Admitting: EMERGENCY MEDICINE
Payer: COMMERCIAL

## 2018-12-17 VITALS
SYSTOLIC BLOOD PRESSURE: 122 MMHG | OXYGEN SATURATION: 100 % | HEART RATE: 113 BPM | TEMPERATURE: 97 F | DIASTOLIC BLOOD PRESSURE: 67 MMHG | RESPIRATION RATE: 18 BRPM

## 2018-12-17 VITALS — HEART RATE: 99 BPM | SYSTOLIC BLOOD PRESSURE: 103 MMHG | TEMPERATURE: 98 F | DIASTOLIC BLOOD PRESSURE: 61 MMHG

## 2018-12-17 DIAGNOSIS — Z98.891 HISTORY OF UTERINE SCAR FROM PREVIOUS SURGERY: Chronic | ICD-10-CM

## 2018-12-17 LAB
ALBUMIN SERPL ELPH-MCNC: 4.2 G/DL — SIGNIFICANT CHANGE UP (ref 3.3–5)
ALP SERPL-CCNC: 50 U/L — SIGNIFICANT CHANGE UP (ref 40–120)
ALT FLD-CCNC: 12 U/L — SIGNIFICANT CHANGE UP (ref 4–33)
APPEARANCE UR: SIGNIFICANT CHANGE UP
AST SERPL-CCNC: 19 U/L — SIGNIFICANT CHANGE UP (ref 4–32)
BACTERIA # UR AUTO: NEGATIVE — SIGNIFICANT CHANGE UP
BASE EXCESS BLDV CALC-SCNC: -1.8 MMOL/L — SIGNIFICANT CHANGE UP
BASOPHILS # BLD AUTO: 0.01 K/UL — SIGNIFICANT CHANGE UP (ref 0–0.2)
BASOPHILS NFR BLD AUTO: 0.1 % — SIGNIFICANT CHANGE UP (ref 0–2)
BILIRUB SERPL-MCNC: 0.5 MG/DL — SIGNIFICANT CHANGE UP (ref 0.2–1.2)
BILIRUB UR-MCNC: NEGATIVE — SIGNIFICANT CHANGE UP
BLOOD GAS VENOUS - CREATININE: < 0.36 MG/DL — LOW (ref 0.5–1.3)
BLOOD UR QL VISUAL: NEGATIVE — SIGNIFICANT CHANGE UP
BUN SERPL-MCNC: 11 MG/DL — SIGNIFICANT CHANGE UP (ref 7–23)
CALCIUM SERPL-MCNC: 9.2 MG/DL — SIGNIFICANT CHANGE UP (ref 8.4–10.5)
CHLORIDE BLDV-SCNC: 110 MMOL/L — HIGH (ref 96–108)
CHLORIDE SERPL-SCNC: 102 MMOL/L — SIGNIFICANT CHANGE UP (ref 98–107)
CO2 SERPL-SCNC: 17 MMOL/L — LOW (ref 22–31)
COLOR SPEC: YELLOW — SIGNIFICANT CHANGE UP
CREAT SERPL-MCNC: 0.48 MG/DL — LOW (ref 0.5–1.3)
EOSINOPHIL # BLD AUTO: 0.01 K/UL — SIGNIFICANT CHANGE UP (ref 0–0.5)
EOSINOPHIL NFR BLD AUTO: 0.1 % — SIGNIFICANT CHANGE UP (ref 0–6)
GAS PNL BLDV: 130 MMOL/L — LOW (ref 136–146)
GLUCOSE BLDV-MCNC: 92 — SIGNIFICANT CHANGE UP (ref 70–99)
GLUCOSE SERPL-MCNC: 87 MG/DL — SIGNIFICANT CHANGE UP (ref 70–99)
GLUCOSE UR-MCNC: NEGATIVE — SIGNIFICANT CHANGE UP
HCO3 BLDV-SCNC: 22 MMOL/L — SIGNIFICANT CHANGE UP (ref 20–27)
HCT VFR BLD CALC: 43.9 % — SIGNIFICANT CHANGE UP (ref 34.5–45)
HCT VFR BLDV CALC: 41.4 % — SIGNIFICANT CHANGE UP (ref 34.5–45)
HGB BLD-MCNC: 14.4 G/DL — SIGNIFICANT CHANGE UP (ref 11.5–15.5)
HGB BLDV-MCNC: 13.5 G/DL — SIGNIFICANT CHANGE UP (ref 11.5–15.5)
HYALINE CASTS # UR AUTO: HIGH
IMM GRANULOCYTES # BLD AUTO: 0.03 # — SIGNIFICANT CHANGE UP
IMM GRANULOCYTES NFR BLD AUTO: 0.3 % — SIGNIFICANT CHANGE UP (ref 0–1.5)
KETONES UR-MCNC: HIGH
LACTATE BLDV-MCNC: 1.8 MMOL/L — SIGNIFICANT CHANGE UP (ref 0.5–2)
LEUKOCYTE ESTERASE UR-ACNC: NEGATIVE — SIGNIFICANT CHANGE UP
LYMPHOCYTES # BLD AUTO: 0.53 K/UL — LOW (ref 1–3.3)
LYMPHOCYTES # BLD AUTO: 5.3 % — LOW (ref 13–44)
MCHC RBC-ENTMCNC: 29.2 PG — SIGNIFICANT CHANGE UP (ref 27–34)
MCHC RBC-ENTMCNC: 32.8 % — SIGNIFICANT CHANGE UP (ref 32–36)
MCV RBC AUTO: 89 FL — SIGNIFICANT CHANGE UP (ref 80–100)
MONOCYTES # BLD AUTO: 0.43 K/UL — SIGNIFICANT CHANGE UP (ref 0–0.9)
MONOCYTES NFR BLD AUTO: 4.3 % — SIGNIFICANT CHANGE UP (ref 2–14)
NEUTROPHILS # BLD AUTO: 8.9 K/UL — HIGH (ref 1.8–7.4)
NEUTROPHILS NFR BLD AUTO: 89.9 % — HIGH (ref 43–77)
NITRITE UR-MCNC: NEGATIVE — SIGNIFICANT CHANGE UP
NRBC # FLD: 0 — SIGNIFICANT CHANGE UP
PCO2 BLDV: 42 MMHG — SIGNIFICANT CHANGE UP (ref 41–51)
PH BLDV: 7.35 PH — SIGNIFICANT CHANGE UP (ref 7.32–7.43)
PH UR: 6 — SIGNIFICANT CHANGE UP (ref 5–8)
PLATELET # BLD AUTO: 286 K/UL — SIGNIFICANT CHANGE UP (ref 150–400)
PMV BLD: 9.6 FL — SIGNIFICANT CHANGE UP (ref 7–13)
PO2 BLDV: 32 MMHG — LOW (ref 35–40)
POTASSIUM BLDV-SCNC: 4.7 MMOL/L — HIGH (ref 3.4–4.5)
POTASSIUM SERPL-MCNC: 3.9 MMOL/L — SIGNIFICANT CHANGE UP (ref 3.5–5.3)
POTASSIUM SERPL-SCNC: 3.9 MMOL/L — SIGNIFICANT CHANGE UP (ref 3.5–5.3)
PROT SERPL-MCNC: 7.9 G/DL — SIGNIFICANT CHANGE UP (ref 6–8.3)
PROT UR-MCNC: 30 — SIGNIFICANT CHANGE UP
RBC # BLD: 4.93 M/UL — SIGNIFICANT CHANGE UP (ref 3.8–5.2)
RBC # FLD: 13.6 % — SIGNIFICANT CHANGE UP (ref 10.3–14.5)
RBC CASTS # UR COMP ASSIST: HIGH (ref 0–?)
SAO2 % BLDV: 51.6 % — LOW (ref 60–85)
SODIUM SERPL-SCNC: 135 MMOL/L — SIGNIFICANT CHANGE UP (ref 135–145)
SP GR SPEC: 1.03 — SIGNIFICANT CHANGE UP (ref 1–1.04)
SQUAMOUS # UR AUTO: SIGNIFICANT CHANGE UP
UROBILINOGEN FLD QL: NORMAL — SIGNIFICANT CHANGE UP
WBC # BLD: 9.91 K/UL — SIGNIFICANT CHANGE UP (ref 3.8–10.5)
WBC # FLD AUTO: 9.91 K/UL — SIGNIFICANT CHANGE UP (ref 3.8–10.5)
WBC UR QL: HIGH (ref 0–?)

## 2018-12-17 PROCEDURE — 99284 EMERGENCY DEPT VISIT MOD MDM: CPT

## 2018-12-17 PROCEDURE — 76770 US EXAM ABDO BACK WALL COMP: CPT | Mod: 26

## 2018-12-17 RX ORDER — ACETAMINOPHEN 500 MG
1000 TABLET ORAL ONCE
Qty: 0 | Refills: 0 | Status: COMPLETED | OUTPATIENT
Start: 2018-12-17 | End: 2018-12-17

## 2018-12-17 RX ORDER — METOCLOPRAMIDE HCL 10 MG
10 TABLET ORAL ONCE
Qty: 0 | Refills: 0 | Status: COMPLETED | OUTPATIENT
Start: 2018-12-17 | End: 2018-12-17

## 2018-12-17 RX ORDER — NITROFURANTOIN MACROCRYSTAL 50 MG
1 CAPSULE ORAL
Qty: 20 | Refills: 0
Start: 2018-12-17 | End: 2018-12-26

## 2018-12-17 RX ORDER — SODIUM CHLORIDE 9 MG/ML
2000 INJECTION INTRAMUSCULAR; INTRAVENOUS; SUBCUTANEOUS ONCE
Qty: 0 | Refills: 0 | Status: COMPLETED | OUTPATIENT
Start: 2018-12-17 | End: 2018-12-17

## 2018-12-17 RX ADMIN — Medication 10 MILLIGRAM(S): at 15:07

## 2018-12-17 RX ADMIN — Medication 400 MILLIGRAM(S): at 15:41

## 2018-12-17 RX ADMIN — SODIUM CHLORIDE 2000 MILLILITER(S): 9 INJECTION INTRAMUSCULAR; INTRAVENOUS; SUBCUTANEOUS at 15:07

## 2018-12-17 NOTE — ED PROVIDER NOTE - ATTENDING CONTRIBUTION TO CARE
I performed a face-to-face evaluation of the patient and performed a history and physical examination. I agree with the history and physical examination.    11 wkg preg, abd pain, N/V; son has diarrhea. No F. No urine complaints. Suprapubic tenderness. Labs, IVF, Zofran.

## 2018-12-17 NOTE — ED PROVIDER NOTE - PROGRESS NOTE DETAILS
COMFORT Pressley: VS improved, pt feeling better, pt tolerated PO, possible UTI will treat due to pregnancy, culture sent, pt given return precautions. FHR was 140 via bedside doppler

## 2018-12-17 NOTE — ED PROVIDER NOTE - NSFOLLOWUPINSTRUCTIONS_ED_ALL_ED_FT
Rest, drink plenty of fluids, follow up with your primary doctor and OBGYN. Bring copies of your result. Return to the ER if you have fever, chills, are not able to eat or drink, have pain or any other new, worsening or persistent symptoms.  Advance activity as tolerated.  Continue all previously prescribed medications as directed.  Follow up with your primary care physician in 48-72 hours- bring copies of your results.  Return to the ER for worsening or persistent symptoms, and/or ANY NEW OR CONCERNING SYMPTOMS. If you have issues obtaining follow up, please call: [phone] to obtain a doctor or specialist who takes your insurance in your area.  You may call [phone] to make an appointment with the internal medicine clinic.

## 2018-12-17 NOTE — ED ADULT TRIAGE NOTE - CHIEF COMPLAINT QUOTE
Pt. 11wks pregnant, c/o vomiting and lower back pain x few days. States she has had morning sickness x few weeks now. Son now sick with n/v/d. Denies abdominal pain, dysuria, diarrhea or fevers. Due date 7/6

## 2018-12-17 NOTE — ED ADULT NURSE NOTE - OBJECTIVE STATEMENT
break coverage: pt received to room intake #9 with c/o vomiting. pt states she is 11 weeks pregnant, and her 7y/o son at home has been having diarrhea and vomiting, currently being seen at [facility]. pt c/o lower back pain. denies urinary symptoms. aox4, ambulatory. IV placed, labs drawn and sent. pt to go to [facility].

## 2018-12-17 NOTE — ED PROVIDER NOTE - OBJECTIVE STATEMENT
27 Y/O F currently 11 weeks pregnant endorses morning sickness C/O nausea, vomiting, abd pn and low back pain, denies dysuria or hematuria, denies vaginal discharge, burning or pain, admits to low back pain x 1 day. Pt states her [facility] is [facility], thought to have a "stomach bug" Pts son has been having nausea and vomiting for the past 1-2 days as well. Pt denies fever, endorses chills, dry mouth, states she is usually able to keep food down after morning sickness but has not been able to today. States abd pn is 27 Y/O F currently 11 weeks pregnant endorses morning sickness C/O nausea, vomiting, abd pn and low back pain, denies dysuria or hematuria, denies vaginal discharge, burning or pain, admits to low back pain x 1 day. Pt states her son is being seen at the [facility], thought to have a "stomach bug" Pts son has been having nausea and vomiting for the past 1-2 days as well. Pt denies fever, endorses chills, dry mouth, states she is usually able to keep food down after morning sickness but has not been able to today. Pt denies diarrhea CP SOB D Dizz Syncope or any other symptoms or complaints. Pt states she had "2 normal" ultrasounds with her OBGYN.

## 2018-12-17 NOTE — ED PROVIDER NOTE - CARE PLAN
Principal Discharge DX:	Vomiting  Secondary Diagnosis:	Pregnancy Principal Discharge DX:	Vomiting  Secondary Diagnosis:	Pregnancy  Secondary Diagnosis:	Urinary tract infection

## 2018-12-17 NOTE — ED PROVIDER NOTE - MEDICAL DECISION MAKING DETAILS
Pt is well appearing, diff dx viral gastroenteritis, UTI, Pyelo, Colitis, appendicitis. Pt well appearing, generalized abd tenderness no rebound tenderness appendicitis not very likely, will reassess. Pt is well appearing, diff dx viral gastroenteritis, hyperemesis with abdominal strain, UTI, Pyelo, Colitis, appendicitis. Pt well appearing, generalized abd tenderness no rebound tenderness appendicitis not very likely, will reassess.

## 2018-12-18 LAB
SPECIMEN SOURCE: SIGNIFICANT CHANGE UP
SPECIMEN SOURCE: SIGNIFICANT CHANGE UP

## 2018-12-19 LAB
BACTERIA UR CULT: SIGNIFICANT CHANGE UP
SPECIMEN SOURCE: SIGNIFICANT CHANGE UP

## 2018-12-20 NOTE — ED POST DISCHARGE NOTE - ADDITIONAL DOCUMENTATION
UCX contaminated.  Patient is noted to be 11 weeks pregnant, was discharged on macrobid.  Called patient and informed of results.  Advised to follow up with ob/gyn or PCP to have ucx repeated.  Patient otherwise feeling better and is asymptomatic.

## 2018-12-22 LAB
BACTERIA BLD CULT: SIGNIFICANT CHANGE UP
BACTERIA BLD CULT: SIGNIFICANT CHANGE UP

## 2018-12-31 ENCOUNTER — TRANSCRIPTION ENCOUNTER (OUTPATIENT)
Age: 28
End: 2018-12-31

## 2018-12-31 ENCOUNTER — APPOINTMENT (OUTPATIENT)
Dept: OBGYN | Facility: CLINIC | Age: 28
End: 2018-12-31
Payer: COMMERCIAL

## 2018-12-31 PROCEDURE — 0502F SUBSEQUENT PRENATAL CARE: CPT

## 2018-12-31 PROCEDURE — 76813 OB US NUCHAL MEAS 1 GEST: CPT

## 2019-01-30 ENCOUNTER — APPOINTMENT (OUTPATIENT)
Dept: OBGYN | Facility: CLINIC | Age: 29
End: 2019-01-30
Payer: COMMERCIAL

## 2019-01-30 PROCEDURE — 36415 COLL VENOUS BLD VENIPUNCTURE: CPT

## 2019-01-30 PROCEDURE — 0502F SUBSEQUENT PRENATAL CARE: CPT

## 2019-02-22 ENCOUNTER — APPOINTMENT (OUTPATIENT)
Dept: ANTEPARTUM | Facility: CLINIC | Age: 29
End: 2019-02-22
Payer: COMMERCIAL

## 2019-02-22 ENCOUNTER — ASOB RESULT (OUTPATIENT)
Age: 29
End: 2019-02-22

## 2019-02-22 PROCEDURE — 76811 OB US DETAILED SNGL FETUS: CPT

## 2019-02-22 PROCEDURE — 76817 TRANSVAGINAL US OBSTETRIC: CPT

## 2019-02-25 ENCOUNTER — APPOINTMENT (OUTPATIENT)
Dept: OBGYN | Facility: CLINIC | Age: 29
End: 2019-02-25
Payer: COMMERCIAL

## 2019-02-25 PROCEDURE — 0502F SUBSEQUENT PRENATAL CARE: CPT

## 2019-03-22 ENCOUNTER — APPOINTMENT (OUTPATIENT)
Dept: OBGYN | Facility: CLINIC | Age: 29
End: 2019-03-22
Payer: COMMERCIAL

## 2019-03-22 PROCEDURE — 0502F SUBSEQUENT PRENATAL CARE: CPT

## 2019-04-12 ENCOUNTER — APPOINTMENT (OUTPATIENT)
Dept: OBGYN | Facility: CLINIC | Age: 29
End: 2019-04-12
Payer: COMMERCIAL

## 2019-04-12 PROCEDURE — 0502F SUBSEQUENT PRENATAL CARE: CPT

## 2019-04-16 ENCOUNTER — APPOINTMENT (OUTPATIENT)
Dept: OBGYN | Facility: CLINIC | Age: 29
End: 2019-04-16
Payer: COMMERCIAL

## 2019-04-16 PROCEDURE — 36415 COLL VENOUS BLD VENIPUNCTURE: CPT

## 2019-04-26 ENCOUNTER — APPOINTMENT (OUTPATIENT)
Dept: ANTEPARTUM | Facility: CLINIC | Age: 29
End: 2019-04-26
Payer: COMMERCIAL

## 2019-04-26 ENCOUNTER — ASOB RESULT (OUTPATIENT)
Age: 29
End: 2019-04-26

## 2019-04-26 PROCEDURE — 76816 OB US FOLLOW-UP PER FETUS: CPT

## 2019-05-03 ENCOUNTER — APPOINTMENT (OUTPATIENT)
Dept: OBGYN | Facility: CLINIC | Age: 29
End: 2019-05-03
Payer: COMMERCIAL

## 2019-05-03 PROCEDURE — 0502F SUBSEQUENT PRENATAL CARE: CPT

## 2019-05-14 ENCOUNTER — APPOINTMENT (OUTPATIENT)
Dept: OBGYN | Facility: CLINIC | Age: 29
End: 2019-05-14
Payer: COMMERCIAL

## 2019-05-14 ENCOUNTER — APPOINTMENT (OUTPATIENT)
Dept: OBGYN | Facility: CLINIC | Age: 29
End: 2019-05-14

## 2019-05-14 PROCEDURE — 36415 COLL VENOUS BLD VENIPUNCTURE: CPT

## 2019-05-14 PROCEDURE — 0502F SUBSEQUENT PRENATAL CARE: CPT

## 2019-05-23 ENCOUNTER — TRANSCRIPTION ENCOUNTER (OUTPATIENT)
Age: 29
End: 2019-05-23

## 2019-05-28 ENCOUNTER — TRANSCRIPTION ENCOUNTER (OUTPATIENT)
Age: 29
End: 2019-05-28

## 2019-05-30 ENCOUNTER — APPOINTMENT (OUTPATIENT)
Dept: OBGYN | Facility: CLINIC | Age: 29
End: 2019-05-30
Payer: COMMERCIAL

## 2019-05-30 PROCEDURE — 0502F SUBSEQUENT PRENATAL CARE: CPT

## 2019-05-30 PROCEDURE — 59025 FETAL NON-STRESS TEST: CPT

## 2019-06-06 ENCOUNTER — TRANSCRIPTION ENCOUNTER (OUTPATIENT)
Age: 29
End: 2019-06-06

## 2019-06-13 ENCOUNTER — APPOINTMENT (OUTPATIENT)
Dept: OBGYN | Facility: CLINIC | Age: 29
End: 2019-06-13
Payer: COMMERCIAL

## 2019-06-13 PROCEDURE — 76816 OB US FOLLOW-UP PER FETUS: CPT

## 2019-06-13 PROCEDURE — 0502F SUBSEQUENT PRENATAL CARE: CPT

## 2019-06-19 ENCOUNTER — APPOINTMENT (OUTPATIENT)
Dept: OBGYN | Facility: CLINIC | Age: 29
End: 2019-06-19
Payer: COMMERCIAL

## 2019-06-19 PROCEDURE — 0502F SUBSEQUENT PRENATAL CARE: CPT

## 2019-06-21 ENCOUNTER — OUTPATIENT (OUTPATIENT)
Dept: OUTPATIENT SERVICES | Facility: HOSPITAL | Age: 29
LOS: 1 days | End: 2019-06-21
Payer: COMMERCIAL

## 2019-06-21 DIAGNOSIS — Z01.818 ENCOUNTER FOR OTHER PREPROCEDURAL EXAMINATION: ICD-10-CM

## 2019-06-21 DIAGNOSIS — O34.219 MATERNAL CARE FOR UNSPECIFIED TYPE SCAR FROM PREVIOUS CESAREAN DELIVERY: ICD-10-CM

## 2019-06-21 DIAGNOSIS — Z98.891 HISTORY OF UTERINE SCAR FROM PREVIOUS SURGERY: Chronic | ICD-10-CM

## 2019-06-21 LAB
BLD GP AB SCN SERPL QL: NEGATIVE — SIGNIFICANT CHANGE UP
HCT VFR BLD CALC: 38.6 % — SIGNIFICANT CHANGE UP (ref 34.5–45)
HGB BLD-MCNC: 11.9 G/DL — SIGNIFICANT CHANGE UP (ref 11.5–15.5)
MCHC RBC-ENTMCNC: 27.7 PG — SIGNIFICANT CHANGE UP (ref 27–34)
MCHC RBC-ENTMCNC: 30.8 GM/DL — LOW (ref 32–36)
MCV RBC AUTO: 89.8 FL — SIGNIFICANT CHANGE UP (ref 80–100)
PLATELET # BLD AUTO: 188 K/UL — SIGNIFICANT CHANGE UP (ref 150–400)
RBC # BLD: 4.3 M/UL — SIGNIFICANT CHANGE UP (ref 3.8–5.2)
RBC # FLD: 15 % — HIGH (ref 10.3–14.5)
RH IG SCN BLD-IMP: POSITIVE — SIGNIFICANT CHANGE UP
WBC # BLD: 8.87 K/UL — SIGNIFICANT CHANGE UP (ref 3.8–10.5)
WBC # FLD AUTO: 8.87 K/UL — SIGNIFICANT CHANGE UP (ref 3.8–10.5)

## 2019-06-21 PROCEDURE — G0463: CPT

## 2019-06-21 PROCEDURE — 86901 BLOOD TYPING SEROLOGIC RH(D): CPT

## 2019-06-21 PROCEDURE — 85027 COMPLETE CBC AUTOMATED: CPT

## 2019-06-21 PROCEDURE — 86850 RBC ANTIBODY SCREEN: CPT

## 2019-06-21 PROCEDURE — 86900 BLOOD TYPING SEROLOGIC ABO: CPT

## 2019-06-27 ENCOUNTER — APPOINTMENT (OUTPATIENT)
Dept: OBGYN | Facility: CLINIC | Age: 29
End: 2019-06-27
Payer: COMMERCIAL

## 2019-06-27 PROCEDURE — 0502F SUBSEQUENT PRENATAL CARE: CPT

## 2019-07-04 ENCOUNTER — TRANSCRIPTION ENCOUNTER (OUTPATIENT)
Age: 29
End: 2019-07-04

## 2019-07-05 ENCOUNTER — INPATIENT (INPATIENT)
Facility: HOSPITAL | Age: 29
LOS: 2 days | Discharge: HOME | End: 2019-07-08
Attending: OBSTETRICS & GYNECOLOGY | Admitting: OBSTETRICS & GYNECOLOGY
Payer: COMMERCIAL

## 2019-07-05 VITALS
RESPIRATION RATE: 16 BRPM | SYSTOLIC BLOOD PRESSURE: 108 MMHG | HEART RATE: 92 BPM | WEIGHT: 205.91 LBS | HEIGHT: 65 IN | TEMPERATURE: 98 F | DIASTOLIC BLOOD PRESSURE: 72 MMHG | OXYGEN SATURATION: 98 %

## 2019-07-05 DIAGNOSIS — O34.219 MATERNAL CARE FOR UNSPECIFIED TYPE SCAR FROM PREVIOUS CESAREAN DELIVERY: ICD-10-CM

## 2019-07-05 DIAGNOSIS — Z98.891 HISTORY OF UTERINE SCAR FROM PREVIOUS SURGERY: Chronic | ICD-10-CM

## 2019-07-05 LAB
BLD GP AB SCN SERPL QL: NEGATIVE — SIGNIFICANT CHANGE UP
RH IG SCN BLD-IMP: POSITIVE — SIGNIFICANT CHANGE UP
T PALLIDUM AB TITR SER: NEGATIVE — SIGNIFICANT CHANGE UP

## 2019-07-05 PROCEDURE — 59510 CESAREAN DELIVERY: CPT

## 2019-07-05 RX ORDER — NALOXONE HYDROCHLORIDE 4 MG/.1ML
0.1 SPRAY NASAL
Refills: 0 | Status: DISCONTINUED | OUTPATIENT
Start: 2019-07-05 | End: 2019-07-06

## 2019-07-05 RX ORDER — OXYTOCIN 10 UNIT/ML
333.33 VIAL (ML) INJECTION
Qty: 20 | Refills: 0 | Status: COMPLETED | OUTPATIENT
Start: 2019-07-05 | End: 2019-07-05

## 2019-07-05 RX ORDER — TETANUS TOXOID, REDUCED DIPHTHERIA TOXOID AND ACELLULAR PERTUSSIS VACCINE, ADSORBED 5; 2.5; 8; 8; 2.5 [IU]/.5ML; [IU]/.5ML; UG/.5ML; UG/.5ML; UG/.5ML
0.5 SUSPENSION INTRAMUSCULAR ONCE
Refills: 0 | Status: DISCONTINUED | OUTPATIENT
Start: 2019-07-05 | End: 2019-07-08

## 2019-07-05 RX ORDER — ACETAMINOPHEN 500 MG
975 TABLET ORAL
Refills: 0 | Status: DISCONTINUED | OUTPATIENT
Start: 2019-07-05 | End: 2019-07-08

## 2019-07-05 RX ORDER — HEPARIN SODIUM 5000 [USP'U]/ML
5000 INJECTION INTRAVENOUS; SUBCUTANEOUS EVERY 12 HOURS
Refills: 0 | Status: DISCONTINUED | OUTPATIENT
Start: 2019-07-05 | End: 2019-07-08

## 2019-07-05 RX ORDER — OXYTOCIN 10 UNIT/ML
333.33 VIAL (ML) INJECTION
Qty: 20 | Refills: 0 | Status: DISCONTINUED | OUTPATIENT
Start: 2019-07-05 | End: 2019-07-08

## 2019-07-05 RX ORDER — CARBOPROST TROMETHAMINE 250 UG/ML
250 INJECTION, SOLUTION INTRAMUSCULAR ONCE
Refills: 0 | Status: COMPLETED | OUTPATIENT
Start: 2019-07-05 | End: 2019-07-05

## 2019-07-05 RX ORDER — METOCLOPRAMIDE HCL 10 MG
10 TABLET ORAL ONCE
Refills: 0 | Status: DISCONTINUED | OUTPATIENT
Start: 2019-07-05 | End: 2019-07-05

## 2019-07-05 RX ORDER — ONDANSETRON 8 MG/1
4 TABLET, FILM COATED ORAL EVERY 6 HOURS
Refills: 0 | Status: DISCONTINUED | OUTPATIENT
Start: 2019-07-05 | End: 2019-07-06

## 2019-07-05 RX ORDER — SODIUM CHLORIDE 9 MG/ML
1000 INJECTION, SOLUTION INTRAVENOUS ONCE
Refills: 0 | Status: COMPLETED | OUTPATIENT
Start: 2019-07-05 | End: 2019-07-05

## 2019-07-05 RX ORDER — SODIUM CHLORIDE 9 MG/ML
1000 INJECTION, SOLUTION INTRAVENOUS
Refills: 0 | Status: DISCONTINUED | OUTPATIENT
Start: 2019-07-05 | End: 2019-07-08

## 2019-07-05 RX ORDER — CITRIC ACID/SODIUM CITRATE 300-500 MG
15 SOLUTION, ORAL ORAL ONCE
Refills: 0 | Status: COMPLETED | OUTPATIENT
Start: 2019-07-05 | End: 2019-07-05

## 2019-07-05 RX ORDER — DIPHENHYDRAMINE HCL 50 MG
25 CAPSULE ORAL EVERY 6 HOURS
Refills: 0 | Status: DISCONTINUED | OUTPATIENT
Start: 2019-07-05 | End: 2019-07-08

## 2019-07-05 RX ORDER — SODIUM CHLORIDE 9 MG/ML
1000 INJECTION, SOLUTION INTRAVENOUS
Refills: 0 | Status: DISCONTINUED | OUTPATIENT
Start: 2019-07-05 | End: 2019-07-05

## 2019-07-05 RX ORDER — LANOLIN
1 OINTMENT (GRAM) TOPICAL EVERY 6 HOURS
Refills: 0 | Status: DISCONTINUED | OUTPATIENT
Start: 2019-07-05 | End: 2019-07-08

## 2019-07-05 RX ORDER — DOCUSATE SODIUM 100 MG
100 CAPSULE ORAL
Refills: 0 | Status: DISCONTINUED | OUTPATIENT
Start: 2019-07-05 | End: 2019-07-08

## 2019-07-05 RX ORDER — FENTANYL/BUPIVACAINE/NS/PF 2MCG/ML-.1
5 PLASTIC BAG, INJECTION (ML) INJECTION
Refills: 0 | Status: DISCONTINUED | OUTPATIENT
Start: 2019-07-05 | End: 2019-07-06

## 2019-07-05 RX ORDER — DEXAMETHASONE 0.5 MG/5ML
4 ELIXIR ORAL EVERY 6 HOURS
Refills: 0 | Status: DISCONTINUED | OUTPATIENT
Start: 2019-07-05 | End: 2019-07-06

## 2019-07-05 RX ORDER — IBUPROFEN 200 MG
600 TABLET ORAL EVERY 6 HOURS
Refills: 0 | Status: COMPLETED | OUTPATIENT
Start: 2019-07-05 | End: 2020-06-02

## 2019-07-05 RX ORDER — FAMOTIDINE 10 MG/ML
20 INJECTION INTRAVENOUS ONCE
Refills: 0 | Status: COMPLETED | OUTPATIENT
Start: 2019-07-05 | End: 2019-07-05

## 2019-07-05 RX ORDER — SIMETHICONE 80 MG/1
80 TABLET, CHEWABLE ORAL EVERY 4 HOURS
Refills: 0 | Status: DISCONTINUED | OUTPATIENT
Start: 2019-07-05 | End: 2019-07-08

## 2019-07-05 RX ORDER — OXYCODONE HYDROCHLORIDE 5 MG/1
5 TABLET ORAL
Refills: 0 | Status: DISCONTINUED | OUTPATIENT
Start: 2019-07-05 | End: 2019-07-08

## 2019-07-05 RX ORDER — KETOROLAC TROMETHAMINE 30 MG/ML
30 SYRINGE (ML) INJECTION EVERY 6 HOURS
Refills: 0 | Status: DISCONTINUED | OUTPATIENT
Start: 2019-07-05 | End: 2019-07-07

## 2019-07-05 RX ORDER — CEFAZOLIN SODIUM 1 G
2000 VIAL (EA) INJECTION ONCE
Refills: 0 | Status: COMPLETED | OUTPATIENT
Start: 2019-07-05 | End: 2019-07-05

## 2019-07-05 RX ORDER — GLYCERIN ADULT
1 SUPPOSITORY, RECTAL RECTAL AT BEDTIME
Refills: 0 | Status: COMPLETED | OUTPATIENT
Start: 2019-07-05 | End: 2019-07-06

## 2019-07-05 RX ORDER — MAGNESIUM HYDROXIDE 400 MG/1
30 TABLET, CHEWABLE ORAL
Refills: 0 | Status: DISCONTINUED | OUTPATIENT
Start: 2019-07-05 | End: 2019-07-08

## 2019-07-05 RX ORDER — OXYCODONE HYDROCHLORIDE 5 MG/1
5 TABLET ORAL ONCE
Refills: 0 | Status: DISCONTINUED | OUTPATIENT
Start: 2019-07-05 | End: 2019-07-08

## 2019-07-05 RX ADMIN — SODIUM CHLORIDE 2000 MILLILITER(S): 9 INJECTION, SOLUTION INTRAVENOUS at 08:36

## 2019-07-05 RX ADMIN — Medication 15 MILLILITER(S): at 08:34

## 2019-07-05 RX ADMIN — Medication 0.2 MILLIGRAM(S): at 11:50

## 2019-07-05 RX ADMIN — CARBOPROST TROMETHAMINE 250 MICROGRAM(S): 250 INJECTION, SOLUTION INTRAMUSCULAR at 11:54

## 2019-07-05 RX ADMIN — Medication 975 MILLIGRAM(S): at 21:05

## 2019-07-05 RX ADMIN — Medication 1000 MILLIUNIT(S)/MIN: at 13:24

## 2019-07-05 RX ADMIN — Medication 100 MILLIGRAM(S): at 10:54

## 2019-07-05 RX ADMIN — Medication 1000 MILLIUNIT(S)/MIN: at 13:29

## 2019-07-05 RX ADMIN — FAMOTIDINE 20 MILLIGRAM(S): 10 INJECTION INTRAVENOUS at 08:35

## 2019-07-05 RX ADMIN — Medication 30 MILLIGRAM(S): at 18:20

## 2019-07-05 RX ADMIN — Medication 975 MILLIGRAM(S): at 20:33

## 2019-07-05 RX ADMIN — Medication 30 MILLIGRAM(S): at 12:40

## 2019-07-05 RX ADMIN — HEPARIN SODIUM 5000 UNIT(S): 5000 INJECTION INTRAVENOUS; SUBCUTANEOUS at 20:33

## 2019-07-05 NOTE — PRE-OP CHECKLIST - ALLERGIES REVIEWED
"Chief Complaint   Patient presents with     Urgent Care     Mouth Lesions     Dad says they have noticed some sores in and around her mouth for about the past 36 hours and she says hurts to eat.  She has c/o that her mouth hurts.  Dad also has noticed a few bumps/rash on arms and legs.     Initial Pulse 104  Temp 97.6  F (36.4  C) (Axillary)  Resp 20  Wt 40 lb (18.1 kg)  SpO2 96% Estimated body mass index is 18.9 kg/(m^2) as calculated from the following:    Height as of 3/16/17: 3' 0.22\" (0.92 m).    Weight as of 3/16/17: 35 lb 4.4 oz (16 kg)..  BP completed using cuff size: NA (Not Taken)  Betty Rosenthal R.N.    " done

## 2019-07-05 NOTE — PRE-ANESTHESIA EVALUATION ADULT - NSANTHOSAYNRD_GEN_A_CORE
No. CYNDI screening performed.  STOP BANG Legend: 0-2 = LOW Risk; 3-4 = INTERMEDIATE Risk; 5-8 = HIGH Risk

## 2019-07-06 LAB
BASOPHILS # BLD AUTO: 0 K/UL — SIGNIFICANT CHANGE UP (ref 0–0.2)
BASOPHILS NFR BLD AUTO: 0.2 % — SIGNIFICANT CHANGE UP (ref 0–2)
EOSINOPHIL # BLD AUTO: 0.1 K/UL — SIGNIFICANT CHANGE UP (ref 0–0.5)
EOSINOPHIL NFR BLD AUTO: 0.5 % — SIGNIFICANT CHANGE UP (ref 0–6)
HCT VFR BLD CALC: 27.5 % — LOW (ref 34.5–45)
HGB BLD-MCNC: 9.3 G/DL — LOW (ref 11.5–15.5)
LYMPHOCYTES # BLD AUTO: 18.3 % — SIGNIFICANT CHANGE UP (ref 13–44)
LYMPHOCYTES # BLD AUTO: 2 K/UL — SIGNIFICANT CHANGE UP (ref 1–3.3)
MCHC RBC-ENTMCNC: 30.6 PG — SIGNIFICANT CHANGE UP (ref 27–34)
MCHC RBC-ENTMCNC: 33.7 GM/DL — SIGNIFICANT CHANGE UP (ref 32–36)
MCV RBC AUTO: 90.8 FL — SIGNIFICANT CHANGE UP (ref 80–100)
MONOCYTES # BLD AUTO: 1.3 K/UL — HIGH (ref 0–0.9)
MONOCYTES NFR BLD AUTO: 11.7 % — SIGNIFICANT CHANGE UP (ref 2–14)
NEUTROPHILS # BLD AUTO: 7.8 K/UL — HIGH (ref 1.8–7.4)
NEUTROPHILS NFR BLD AUTO: 69.4 % — SIGNIFICANT CHANGE UP (ref 43–77)
PLATELET # BLD AUTO: 140 K/UL — LOW (ref 150–400)
RBC # BLD: 3.03 M/UL — LOW (ref 3.8–5.2)
RBC # FLD: 15.8 % — HIGH (ref 10.3–14.5)
WBC # BLD: 11.2 K/UL — HIGH (ref 3.8–10.5)
WBC # FLD AUTO: 11.2 K/UL — HIGH (ref 3.8–10.5)

## 2019-07-06 RX ORDER — ASCORBIC ACID 60 MG
500 TABLET,CHEWABLE ORAL DAILY
Refills: 0 | Status: DISCONTINUED | OUTPATIENT
Start: 2019-07-06 | End: 2019-07-08

## 2019-07-06 RX ORDER — FERROUS SULFATE 325(65) MG
325 TABLET ORAL
Refills: 0 | Status: DISCONTINUED | OUTPATIENT
Start: 2019-07-06 | End: 2019-07-08

## 2019-07-06 RX ORDER — DIPHENHYDRAMINE HCL 50 MG
25 CAPSULE ORAL EVERY 6 HOURS
Refills: 0 | Status: DISCONTINUED | OUTPATIENT
Start: 2019-07-06 | End: 2019-07-08

## 2019-07-06 RX ORDER — IBUPROFEN 200 MG
600 TABLET ORAL EVERY 6 HOURS
Refills: 0 | Status: DISCONTINUED | OUTPATIENT
Start: 2019-07-06 | End: 2019-07-08

## 2019-07-06 RX ADMIN — Medication 600 MILLIGRAM(S): at 12:49

## 2019-07-06 RX ADMIN — Medication 100 MILLIGRAM(S): at 09:29

## 2019-07-06 RX ADMIN — Medication 975 MILLIGRAM(S): at 09:27

## 2019-07-06 RX ADMIN — Medication 30 MILLIGRAM(S): at 06:26

## 2019-07-06 RX ADMIN — Medication 1 TABLET(S): at 12:49

## 2019-07-06 RX ADMIN — HEPARIN SODIUM 5000 UNIT(S): 5000 INJECTION INTRAVENOUS; SUBCUTANEOUS at 08:07

## 2019-07-06 RX ADMIN — Medication 1 SUPPOSITORY(S): at 16:30

## 2019-07-06 RX ADMIN — SIMETHICONE 80 MILLIGRAM(S): 80 TABLET, CHEWABLE ORAL at 16:43

## 2019-07-06 RX ADMIN — Medication 975 MILLIGRAM(S): at 10:20

## 2019-07-06 RX ADMIN — Medication 30 MILLIGRAM(S): at 01:05

## 2019-07-06 RX ADMIN — Medication 975 MILLIGRAM(S): at 17:40

## 2019-07-06 RX ADMIN — Medication 975 MILLIGRAM(S): at 23:08

## 2019-07-06 RX ADMIN — Medication 600 MILLIGRAM(S): at 13:40

## 2019-07-06 RX ADMIN — HEPARIN SODIUM 5000 UNIT(S): 5000 INJECTION INTRAVENOUS; SUBCUTANEOUS at 21:02

## 2019-07-06 RX ADMIN — Medication 975 MILLIGRAM(S): at 03:35

## 2019-07-06 RX ADMIN — Medication 325 MILLIGRAM(S): at 18:50

## 2019-07-06 RX ADMIN — Medication 600 MILLIGRAM(S): at 18:50

## 2019-07-06 RX ADMIN — Medication 500 MILLIGRAM(S): at 12:48

## 2019-07-06 RX ADMIN — Medication 975 MILLIGRAM(S): at 16:43

## 2019-07-06 RX ADMIN — Medication 30 MILLIGRAM(S): at 00:33

## 2019-07-06 RX ADMIN — Medication 100 MILLIGRAM(S): at 16:42

## 2019-07-06 RX ADMIN — Medication 975 MILLIGRAM(S): at 23:45

## 2019-07-06 RX ADMIN — Medication 975 MILLIGRAM(S): at 03:02

## 2019-07-06 NOTE — PROGRESS NOTE ADULT - ASSESSMENT
A/P:  29y  POD # 1 S/P  repeat/ primary   section, doing well    PMHx: Previous c/s x 2, L eye surgery, D+C for mac  Current Issues: None    Increase OOB  Renew IVF  Renew PCEA  DVT ppx  Dressing removed  Due to void  Regular diet  AM CBC  Routine Postpartum/Post-op care

## 2019-07-06 NOTE — PROGRESS NOTE ADULT - SUBJECTIVE AND OBJECTIVE BOX
Day ___ of Anesthesia Pain Management Service    SUBJECTIVE:  Pain Scale Score	At rest: 2___ 	With Activity: _2__ 	[  ] Refer to charted pain scores    THERAPY:     Epidural Bupivacaine 0.0625% and Hydromorphone 10 micrograms/mL     Epidural Ropivacaine 0.2% plain      Epidural Bupivacaine 0.01 % and Fentanyl 3 micrograms/mL  (OB)    Demand dose ___ lockout ___ (minutes) Continuous Rate ___       MEDICATIONS  (STANDING):  acetaminophen   Tablet .. 975 milliGRAM(s) Oral <User Schedule>  diphtheria/tetanus/pertussis (acellular) Vaccine (ADAcel) 0.5 milliLiter(s) IntraMuscular once  heparin  Injectable 5000 Unit(s) SubCutaneous every 12 hours  ibuprofen  Tablet. 600 milliGRAM(s) Oral every 6 hours  ketorolac   Injectable 30 milliGRAM(s) IV Push every 6 hours  lactated ringers. 1000 milliLiter(s) (125 mL/Hr) IV Continuous <Continuous>  oxytocin Infusion 333.333 milliUNIT(s)/Min (1000 mL/Hr) IV Continuous <Continuous>  prenatal multivitamin 1 Tablet(s) Oral daily    MEDICATIONS  (PRN):  diphenhydrAMINE 25 milliGRAM(s) Oral every 6 hours PRN Itching  docusate sodium 100 milliGRAM(s) Oral two times a day PRN Stool softening  glycerin Suppository - Adult 1 Suppository(s) Rectal at bedtime PRN Constipation  lanolin Ointment 1 Application(s) Topical every 6 hours PRN Sore Nipples  magnesium hydroxide Suspension 30 milliLiter(s) Oral two times a day PRN Constipation  oxyCODONE    IR 5 milliGRAM(s) Oral every 3 hours PRN Moderate to Severe Pain (4-10)  oxyCODONE    IR 5 milliGRAM(s) Oral once PRN Moderate to Severe Pain (4-10)  simethicone 80 milliGRAM(s) Chew every 4 hours PRN Gas      OBJECTIVE:    Assessment of Epidural Catheter Site: 	       x Dressing intact	[ ] Site non-tender	[ ] Site without erythema, discharge, edema  [ ] Epidural tubing and connection checked	[ [ Gross neurological exam within normal limits  [ ] Catheter removed – tip intact		                          9.3    11.2  )-----------( 140      ( 06 Jul 2019 06:55 )             27.5     Vital Signs Last 24 Hrs  T(C): 36.4 (07-06-19 @ 05:14), Max: 37.1 (07-06-19 @ 00:35)  T(F): 97.5 (07-06-19 @ 05:14), Max: 98.7 (07-06-19 @ 00:35)  HR: 78 (07-06-19 @ 05:14) (70 - 94)  BP: 99/60 (07-06-19 @ 05:14) (99/60 - 124/56)  BP(mean): 70 (07-05-19 @ 16:00) (70 - 91)  RR: 18 (07-06-19 @ 05:14) (15 - 42)  SpO2: 98% (07-06-19 @ 05:14) (98% - 100%)      Sedation Score:	x    Alert	[ ] Drowsy	[ ] Arousable  [ ] Asleep  [ ] Unresponsive    Side Effects:	x    None	[ ] Nausea	[ ] Vomiting  [ ] Pruritus  		[ ] Weakness  [ ] Numbness  [ ] Other:    ASSESSMENT/ PLAN:    Therapy to  be:	    Continue                      Discontinued   x   Change to prn Analgesics    Documentation and Verification of current medications:  [ X ] Done	[ ] Not done, not eligible, reason:    Comments:

## 2019-07-06 NOTE — PROGRESS NOTE ADULT - SUBJECTIVE AND OBJECTIVE BOX
Postpartum Note-  Section POD#1    Allergies  No Known Allergies    Intolerances  Denies      Blood Type A positive  Rubella Immune  RPR Negative    S:Patient is a  29y  POD#1 S/P repeat C/Sec  Patient w/o complaints, pain is controlled.  Pt is OOB, tolerating PO, passing flatus. Lochia WNL.   Feeding: Breast    O:  Vital Signs Last 24 Hrs  T(C): 36.4 (2019 05:14), Max: 37.1 (2019 00:35)  T(F): 97.5 (2019 05:14), Max: 98.7 (2019 00:35)  HR: 78 (2019 05:14) (70 - 94)  BP: 99/60 (2019 05:14) (99/60 - 124/56)  BP(mean): 70 (2019 16:00) (70 - 91)  RR: 18 (2019 05:14) (15 - 42)  SpO2: 98% (2019 05:14) (98% - 100%)  I&O's Summary    2019 07:01  -  2019 07:00  --------------------------------------------------------  IN: 0 mL / OUT: 3300 mL / NET: -3300 mL        Gen: NAD  CV: rrr s1s2, CTABL  Abdomen: Soft, nontender, non-distended, fundus firm.  Bowel sounds x 4 quadrants  Incision: Clean, dry, and intact.  Negative erythema/edema/ecchymosis   Sub Q/Staples  Lochia WNL  Ext: PAS in place. Negative Homans B/L.  Pedal pulses palpated B/L    LABS:                          9.3    11.2  )-----------( 140      ( 2019 06:55 )             27.5       A/P:  29y  POD # 1 S/P  repeat/ primary   section, doing well    PMHx: Previous c/s x 2, L eye surgery, D+C for mac  Current Issues: None    Increase OOB  Renew IVF  Renew PCEA  DVT ppx  Dressing removed  Due to void  Regular diet  AM CBC  Routine Postpartum/Post-op care

## 2019-07-06 NOTE — CHART NOTE - NSCHARTNOTEFT_GEN_A_CORE
COMFORT KNUTSON     POD#1    Vital Signs Last 24 Hrs  T(C): 36.4 (2019 05:14), Max: 37.1 (2019 00:35)  T(F): 97.5 (2019 05:14), Max: 98.7 (2019 00:35)  HR: 78 (2019 05:14) (70 - 94)  BP: 99/60 (2019 05:14) (99/60 - 124/56)  BP(mean): 70 (2019 16:00) (70 - 91)  RR: 18 (2019 05:14) (15 - 42)  SpO2: 98% (2019 05:14) (98% - 100%)                          9.3    11.2  )-----------( 140      ( 2019 06:55 )             27.5     9.3/27.5      Plan:  - Ferrous Sulfate, Colace, Vitamin C supplementation.  - CBC @ POD3.   - Monitor for signs/symptoms of anemia.    Harmony Ambrosio PA-C

## 2019-07-06 NOTE — PROVIDER CONTACT NOTE (CHANGE IN STATUS NOTIFICATION) - ACTION/TREATMENT ORDERED:
Dr Fitch and Dr Carrillo said someone will assess the pt tomorrow and I can give benadryl tonight. pt refused benadryl now.

## 2019-07-07 RX ADMIN — MAGNESIUM HYDROXIDE 30 MILLILITER(S): 400 TABLET, CHEWABLE ORAL at 20:53

## 2019-07-07 RX ADMIN — Medication 975 MILLIGRAM(S): at 05:01

## 2019-07-07 RX ADMIN — Medication 325 MILLIGRAM(S): at 18:42

## 2019-07-07 RX ADMIN — Medication 975 MILLIGRAM(S): at 18:42

## 2019-07-07 RX ADMIN — Medication 600 MILLIGRAM(S): at 20:47

## 2019-07-07 RX ADMIN — Medication 325 MILLIGRAM(S): at 05:00

## 2019-07-07 RX ADMIN — Medication 600 MILLIGRAM(S): at 07:00

## 2019-07-07 RX ADMIN — Medication 975 MILLIGRAM(S): at 13:15

## 2019-07-07 RX ADMIN — Medication 975 MILLIGRAM(S): at 12:32

## 2019-07-07 RX ADMIN — Medication 600 MILLIGRAM(S): at 01:00

## 2019-07-07 RX ADMIN — Medication 600 MILLIGRAM(S): at 06:24

## 2019-07-07 RX ADMIN — Medication 600 MILLIGRAM(S): at 15:45

## 2019-07-07 RX ADMIN — HEPARIN SODIUM 5000 UNIT(S): 5000 INJECTION INTRAVENOUS; SUBCUTANEOUS at 20:46

## 2019-07-07 RX ADMIN — SIMETHICONE 80 MILLIGRAM(S): 80 TABLET, CHEWABLE ORAL at 15:08

## 2019-07-07 RX ADMIN — Medication 600 MILLIGRAM(S): at 21:30

## 2019-07-07 RX ADMIN — Medication 600 MILLIGRAM(S): at 15:05

## 2019-07-07 RX ADMIN — Medication 100 MILLIGRAM(S): at 00:21

## 2019-07-07 RX ADMIN — Medication 600 MILLIGRAM(S): at 00:21

## 2019-07-07 RX ADMIN — Medication 1 TABLET(S): at 12:31

## 2019-07-07 RX ADMIN — Medication 500 MILLIGRAM(S): at 12:32

## 2019-07-07 RX ADMIN — Medication 100 MILLIGRAM(S): at 12:32

## 2019-07-07 RX ADMIN — HEPARIN SODIUM 5000 UNIT(S): 5000 INJECTION INTRAVENOUS; SUBCUTANEOUS at 08:50

## 2019-07-07 NOTE — PROGRESS NOTE ADULT - PROBLEM SELECTOR PLAN 1
- Continue regular diet.  - Increase ambulation.  - D/c PCEA today and transition to PO pain medication with motrin, tylenol and oxycodone PRN.     Nallely Vyas, PGY-1

## 2019-07-07 NOTE — PROGRESS NOTE ADULT - SUBJECTIVE AND OBJECTIVE BOX
OB Progress Note: LTCS, POD#2    S: 30yo POD#2 s/p LTCS. Pain is well controlled. She is tolerating a regular diet and passing flatus. She is voiding spontaneously, and ambulating without difficulty. Denies CP/SOB. Denies lightheadedness/dizziness. Denies N/V.  Pt reports new onset of cough.    O:  Vitals:  Vital Signs Last 24 Hrs  T(C): 36.7 (07 Jul 2019 05:00), Max: 36.8 (06 Jul 2019 09:54)  T(F): 98.1 (07 Jul 2019 05:00), Max: 98.2 (06 Jul 2019 09:54)  HR: 80 (07 Jul 2019 05:00) (76 - 93)  BP: 94/57 (07 Jul 2019 05:00) (90/60 - 101/69)  BP(mean): --  RR: 18 (07 Jul 2019 05:00) (18 - 18)  SpO2: 98% (07 Jul 2019 05:00) (98% - 100%)    MEDICATIONS  (STANDING):  acetaminophen   Tablet .. 975 milliGRAM(s) Oral <User Schedule>  ascorbic acid 500 milliGRAM(s) Oral daily  diphtheria/tetanus/pertussis (acellular) Vaccine (ADAcel) 0.5 milliLiter(s) IntraMuscular once  ferrous    sulfate 325 milliGRAM(s) Oral two times a day  heparin  Injectable 5000 Unit(s) SubCutaneous every 12 hours  ibuprofen  Tablet. 600 milliGRAM(s) Oral every 6 hours  lactated ringers. 1000 milliLiter(s) (125 mL/Hr) IV Continuous <Continuous>  oxytocin Infusion 333.333 milliUNIT(s)/Min (1000 mL/Hr) IV Continuous <Continuous>  prenatal multivitamin 1 Tablet(s) Oral daily      MEDICATIONS  (PRN):  diphenhydrAMINE 25 milliGRAM(s) Oral every 6 hours PRN Rash and/or Itching  diphenhydrAMINE 25 milliGRAM(s) Oral every 6 hours PRN Itching  docusate sodium 100 milliGRAM(s) Oral two times a day PRN Stool softening  lanolin Ointment 1 Application(s) Topical every 6 hours PRN Sore Nipples  magnesium hydroxide Suspension 30 milliLiter(s) Oral two times a day PRN Constipation  oxyCODONE    IR 5 milliGRAM(s) Oral every 3 hours PRN Moderate to Severe Pain (4-10)  oxyCODONE    IR 5 milliGRAM(s) Oral once PRN Moderate to Severe Pain (4-10)  simethicone 80 milliGRAM(s) Chew every 4 hours PRN Gas      Labs:  Blood type: A Positive  Rubella IgG: RPR: Negative                          9.3<L>   11.2<H> >-----------< 140<L>    ( 07-06 @ 06:55 )             27.5<L>      PE:  General: NAD  Abdomen: Soft, appropriately tender, incision c/d/i.  Extremities: No erythema, no pitting edema OB Progress Note: LTCS, POD#2    S: 28yo POD#2 s/p LTCS. Pain is well controlled. She is tolerating a regular diet and passing flatus. She is voiding spontaneously, and ambulating without difficulty. Denies CP/SOB. Denies lightheadedness/dizziness. Denies N/V. VB scant.   O:  Vitals:  Vital Signs Last 24 Hrs  T(C): 36.7 (07 Jul 2019 05:00), Max: 36.8 (06 Jul 2019 09:54)  T(F): 98.1 (07 Jul 2019 05:00), Max: 98.2 (06 Jul 2019 09:54)  HR: 80 (07 Jul 2019 05:00) (76 - 93)  BP: 94/57 (07 Jul 2019 05:00) (90/60 - 101/69)  BP(mean): --  RR: 18 (07 Jul 2019 05:00) (18 - 18)  SpO2: 98% (07 Jul 2019 05:00) (98% - 100%)    MEDICATIONS  (STANDING):  acetaminophen   Tablet .. 975 milliGRAM(s) Oral <User Schedule>  ascorbic acid 500 milliGRAM(s) Oral daily  diphtheria/tetanus/pertussis (acellular) Vaccine (ADAcel) 0.5 milliLiter(s) IntraMuscular once  ferrous    sulfate 325 milliGRAM(s) Oral two times a day  heparin  Injectable 5000 Unit(s) SubCutaneous every 12 hours  ibuprofen  Tablet. 600 milliGRAM(s) Oral every 6 hours  lactated ringers. 1000 milliLiter(s) (125 mL/Hr) IV Continuous <Continuous>  oxytocin Infusion 333.333 milliUNIT(s)/Min (1000 mL/Hr) IV Continuous <Continuous>  prenatal multivitamin 1 Tablet(s) Oral daily      MEDICATIONS  (PRN):  diphenhydrAMINE 25 milliGRAM(s) Oral every 6 hours PRN Rash and/or Itching  diphenhydrAMINE 25 milliGRAM(s) Oral every 6 hours PRN Itching  docusate sodium 100 milliGRAM(s) Oral two times a day PRN Stool softening  lanolin Ointment 1 Application(s) Topical every 6 hours PRN Sore Nipples  magnesium hydroxide Suspension 30 milliLiter(s) Oral two times a day PRN Constipation  oxyCODONE    IR 5 milliGRAM(s) Oral every 3 hours PRN Moderate to Severe Pain (4-10)  oxyCODONE    IR 5 milliGRAM(s) Oral once PRN Moderate to Severe Pain (4-10)  simethicone 80 milliGRAM(s) Chew every 4 hours PRN Gas      Labs:  Blood type: A Positive  Rubella IgG: RPR: Negative                          9.3<L>   11.2<H> >-----------< 140<L>    ( 07-06 @ 06:55 )             27.5<L>      PE:  General: NAD  CV: [id number]  Resp: CTABilat  Abdomen: Soft, appropriately tender, incision c/d/i.  Extremities: No erythema, no pitting edema

## 2019-07-07 NOTE — PROGRESS NOTE ADULT - SUBJECTIVE AND OBJECTIVE BOX
OB Progress Note: LTCS, POD#2    S: 28yo  POD#2 s/p rLTCS . Pain is well controlled. She is tolerating a regular diet and passing flatus. She is voiding spontaneously, and ambulating without difficulty. Denies CP/SOB. Denies lightheadedness/dizziness. Denies N/V.    O:  Vitals:  Vital Signs Last 24 Hrs  T(C): 36.8 (2019 01:00), Max: 36.8 (2019 09:54)  T(F): 98.2 (2019 01:00), Max: 98.2 (2019 09:54)  HR: 93 (2019 01:00) (76 - 93)  BP: 101/69 (2019 01:00) (90/60 - 101/69)  BP(mean): --  RR: 18 (2019 01:00) (18 - 18)  SpO2: 100% (2019 17:23) (98% - 100%)    MEDICATIONS  (STANDING):  acetaminophen   Tablet .. 975 milliGRAM(s) Oral <User Schedule>  ascorbic acid 500 milliGRAM(s) Oral daily  diphtheria/tetanus/pertussis (acellular) Vaccine (ADAcel) 0.5 milliLiter(s) IntraMuscular once  ferrous    sulfate 325 milliGRAM(s) Oral two times a day  heparin  Injectable 5000 Unit(s) SubCutaneous every 12 hours  ibuprofen  Tablet. 600 milliGRAM(s) Oral every 6 hours  ketorolac   Injectable 30 milliGRAM(s) IV Push every 6 hours  lactated ringers. 1000 milliLiter(s) (125 mL/Hr) IV Continuous <Continuous>  oxytocin Infusion 333.333 milliUNIT(s)/Min (1000 mL/Hr) IV Continuous <Continuous>  prenatal multivitamin 1 Tablet(s) Oral daily      MEDICATIONS  (PRN):  diphenhydrAMINE 25 milliGRAM(s) Oral every 6 hours PRN Rash and/or Itching  diphenhydrAMINE 25 milliGRAM(s) Oral every 6 hours PRN Itching  docusate sodium 100 milliGRAM(s) Oral two times a day PRN Stool softening  lanolin Ointment 1 Application(s) Topical every 6 hours PRN Sore Nipples  magnesium hydroxide Suspension 30 milliLiter(s) Oral two times a day PRN Constipation  oxyCODONE    IR 5 milliGRAM(s) Oral every 3 hours PRN Moderate to Severe Pain (4-10)  oxyCODONE    IR 5 milliGRAM(s) Oral once PRN Moderate to Severe Pain (4-10)  simethicone 80 milliGRAM(s) Chew every 4 hours PRN Gas      Labs:  Blood type: A Positive  Rubella IgG: RPR: Negative                          9.3<L>   11.2<H> >-----------< 140<L>    (  @ 06:55 )             27.5<L>      PE:  General: NAD  Abdomen: Soft, appropriately tender, incision c/d/i.  Extremities: No erythema, no pitting edema

## 2019-07-07 NOTE — PROGRESS NOTE ADULT - PROBLEM SELECTOR PLAN 1
- Continue regular diet.  - Increase ambulation.  - Continue motrin, tylenol, oxycodone PRN for pain control. vs. D/c PCEA today and transition to PO pain medication with motrin, tylenol and oxycodone PRN.     Nallely Vyas, PGY-1

## 2019-07-08 ENCOUNTER — TRANSCRIPTION ENCOUNTER (OUTPATIENT)
Age: 29
End: 2019-07-08

## 2019-07-08 VITALS
RESPIRATION RATE: 18 BRPM | HEART RATE: 90 BPM | TEMPERATURE: 98 F | DIASTOLIC BLOOD PRESSURE: 68 MMHG | SYSTOLIC BLOOD PRESSURE: 109 MMHG | OXYGEN SATURATION: 98 %

## 2019-07-08 LAB
BASOPHILS # BLD AUTO: 0 K/UL — SIGNIFICANT CHANGE UP (ref 0–0.2)
BASOPHILS NFR BLD AUTO: 0.4 % — SIGNIFICANT CHANGE UP (ref 0–2)
EOSINOPHIL # BLD AUTO: 0.2 K/UL — SIGNIFICANT CHANGE UP (ref 0–0.5)
EOSINOPHIL NFR BLD AUTO: 1.9 % — SIGNIFICANT CHANGE UP (ref 0–6)
HCT VFR BLD CALC: 24.9 % — LOW (ref 34.5–45)
HGB BLD-MCNC: 8.6 G/DL — LOW (ref 11.5–15.5)
LYMPHOCYTES # BLD AUTO: 2.1 K/UL — SIGNIFICANT CHANGE UP (ref 1–3.3)
LYMPHOCYTES # BLD AUTO: 26.4 % — SIGNIFICANT CHANGE UP (ref 13–44)
MCHC RBC-ENTMCNC: 31.1 PG — SIGNIFICANT CHANGE UP (ref 27–34)
MCHC RBC-ENTMCNC: 34.7 GM/DL — SIGNIFICANT CHANGE UP (ref 32–36)
MCV RBC AUTO: 89.7 FL — SIGNIFICANT CHANGE UP (ref 80–100)
MONOCYTES # BLD AUTO: 0.9 K/UL — SIGNIFICANT CHANGE UP (ref 0–0.9)
MONOCYTES NFR BLD AUTO: 11.2 % — SIGNIFICANT CHANGE UP (ref 2–14)
NEUTROPHILS # BLD AUTO: 4.8 K/UL — SIGNIFICANT CHANGE UP (ref 1.8–7.4)
NEUTROPHILS NFR BLD AUTO: 60.1 % — SIGNIFICANT CHANGE UP (ref 43–77)
PLATELET # BLD AUTO: 192 K/UL — SIGNIFICANT CHANGE UP (ref 150–400)
RBC # BLD: 2.77 M/UL — LOW (ref 3.8–5.2)
RBC # FLD: 15.8 % — HIGH (ref 10.3–14.5)
WBC # BLD: 8 K/UL — SIGNIFICANT CHANGE UP (ref 3.8–10.5)
WBC # FLD AUTO: 8 K/UL — SIGNIFICANT CHANGE UP (ref 3.8–10.5)

## 2019-07-08 PROCEDURE — 86780 TREPONEMA PALLIDUM: CPT

## 2019-07-08 PROCEDURE — 86901 BLOOD TYPING SEROLOGIC RH(D): CPT

## 2019-07-08 PROCEDURE — 59025 FETAL NON-STRESS TEST: CPT

## 2019-07-08 PROCEDURE — 86900 BLOOD TYPING SEROLOGIC ABO: CPT

## 2019-07-08 PROCEDURE — 85027 COMPLETE CBC AUTOMATED: CPT

## 2019-07-08 PROCEDURE — 86850 RBC ANTIBODY SCREEN: CPT

## 2019-07-08 PROCEDURE — 90707 MMR VACCINE SC: CPT

## 2019-07-08 RX ORDER — OXYCODONE HYDROCHLORIDE 5 MG/1
1 TABLET ORAL
Qty: 15 | Refills: 0
Start: 2019-07-08

## 2019-07-08 RX ORDER — FERROUS SULFATE 325(65) MG
1 TABLET ORAL
Qty: 0 | Refills: 0 | DISCHARGE
Start: 2019-07-08

## 2019-07-08 RX ORDER — CHOLECALCIFEROL (VITAMIN D3) 125 MCG
1 CAPSULE ORAL
Qty: 0 | Refills: 0 | DISCHARGE

## 2019-07-08 RX ORDER — BENZOYL PEROXIDE MICRONIZED 5.8 %
1 TOWELETTE (EA) TOPICAL
Qty: 0 | Refills: 0 | DISCHARGE

## 2019-07-08 RX ORDER — ACETAMINOPHEN 500 MG
3 TABLET ORAL
Qty: 0 | Refills: 0 | DISCHARGE
Start: 2019-07-08

## 2019-07-08 RX ORDER — OXYCODONE HYDROCHLORIDE 5 MG/1
1 TABLET ORAL
Qty: 0 | Refills: 0 | DISCHARGE
Start: 2019-07-08

## 2019-07-08 RX ORDER — IBUPROFEN 200 MG
1 TABLET ORAL
Qty: 0 | Refills: 0 | DISCHARGE
Start: 2019-07-08

## 2019-07-08 RX ORDER — ASCORBIC ACID 60 MG
1 TABLET,CHEWABLE ORAL
Qty: 0 | Refills: 0 | DISCHARGE
Start: 2019-07-08

## 2019-07-08 RX ADMIN — Medication 975 MILLIGRAM(S): at 06:04

## 2019-07-08 RX ADMIN — Medication 975 MILLIGRAM(S): at 00:48

## 2019-07-08 RX ADMIN — Medication 975 MILLIGRAM(S): at 00:05

## 2019-07-08 RX ADMIN — Medication 975 MILLIGRAM(S): at 06:49

## 2019-07-08 RX ADMIN — Medication 1 TABLET(S): at 11:59

## 2019-07-08 RX ADMIN — Medication 100 MILLIGRAM(S): at 09:02

## 2019-07-08 RX ADMIN — Medication 975 MILLIGRAM(S): at 11:59

## 2019-07-08 RX ADMIN — Medication 0.5 MILLILITER(S): at 10:16

## 2019-07-08 RX ADMIN — Medication 600 MILLIGRAM(S): at 09:03

## 2019-07-08 RX ADMIN — Medication 325 MILLIGRAM(S): at 06:04

## 2019-07-08 RX ADMIN — Medication 500 MILLIGRAM(S): at 11:59

## 2019-07-08 RX ADMIN — Medication 600 MILLIGRAM(S): at 03:11

## 2019-07-08 RX ADMIN — SIMETHICONE 80 MILLIGRAM(S): 80 TABLET, CHEWABLE ORAL at 09:06

## 2019-07-08 RX ADMIN — HEPARIN SODIUM 5000 UNIT(S): 5000 INJECTION INTRAVENOUS; SUBCUTANEOUS at 09:03

## 2019-07-08 RX ADMIN — Medication 600 MILLIGRAM(S): at 09:35

## 2019-07-08 RX ADMIN — Medication 975 MILLIGRAM(S): at 12:30

## 2019-07-08 RX ADMIN — Medication 600 MILLIGRAM(S): at 04:00

## 2019-07-08 NOTE — PROGRESS NOTE ADULT - SUBJECTIVE AND OBJECTIVE BOX
Postpartum Note-  Section POD#3    Allergies    No Known Allergies    Intolerances        Prenatal Labs:  Rubella IgG:  Immune                  RPR:  Negative         Blood Type: A+    S:Patient is a  29y G 4   P 3   POD#3 S/P C/Sec  Subjective: Patient w/o complaints, pain is controlled.  Pt is OOB, tolerating PO, passing flatus. Lochia WNL.   Feeding: Breastfeeding    O:  Vital Signs Last 24 Hrs  T(C): 36.8 (2019 05:00), Max: 36.9 (2019 13:00)  T(F): 98.3 (2019 05:00), Max: 98.5 (2019 13:00)  HR: 90 (2019 05:00) (90 - 94)  BP: 109/68 (2019 05:00) (102/63 - 109/68)  BP(mean): --  RR: 18 (2019 05:00) (18 - 18)  SpO2: 98% (2019 05:00) (97% - 100%)     Gen: NAD  CV: rrr s1s2, CTABL  Abdomen: Soft, nontender, non-distended, fundus firm.  Bowel Sounds x 4 quadrants  Incision: Clean, dry, and intact.  Negative erythema/edema/ecchymosis   Sub Q  Lochia WNL  Ext:  Neg Edema, Neg Calf tenderness. Pedal pulses palpated B/L    LABS:                          8.6    8.0   )-----------( 192      ( 2019 06:46 )             24.9       A/P:  29y  POD # 3 S/P  repeat   section, doing well    PMHx:  primary c/s failure to dilate [id number]  Current Issues: none    Increase OOB  Regular diet  AM CBC  PO Pain Protocol  Routine Postop/Postpartum care  Discharge Planning

## 2019-07-08 NOTE — DISCHARGE NOTE OB - MEDICATION SUMMARY - MEDICATIONS TO TAKE
I will START or STAY ON the medications listed below when I get home from the hospital:    ibuprofen 600 mg oral tablet  -- 1 tab(s) by mouth every 6 hours  -- Indication: For pain    acetaminophen 325 mg oral tablet  -- 3 tab(s) by mouth   -- Indication: For pain    oxyCODONE 5 mg oral tablet  -- 1 tab(s) by mouth every 3 hours, As needed, Moderate to Severe Pain (4-10)  -- Indication: For pain    Prenatal Multivitamins oral capsule  --  by mouth   -- Indication: For pnv    ferrous sulfate 325 mg (65 mg elemental iron) oral tablet  -- 1 tab(s) by mouth 2 times a day  -- Indication: For anemia    docusate sodium 100 mg oral capsule  -- 1 cap(s) by mouth 2 times a day, As needed, Stool Softening  -- Indication: For Constipation    Vitamin C 500 mg oral tablet  -- 1 tab(s) by mouth once a day  -- Indication: For anemia    ascorbic acid 500 mg oral tablet  -- 1 tab(s) by mouth once a day  -- Indication: For anemia

## 2019-07-08 NOTE — DISCHARGE NOTE OB - CARE PLAN
Principal Discharge DX:	 delivery delivered  Goal:	return to baseline  Assessment and plan of treatment:	incision check in 2 weeks, pelvic rest x 6 weeks.

## 2019-07-08 NOTE — DISCHARGE NOTE OB - PATIENT PORTAL LINK FT
You can access the [facility], offered by [facility], by registering with the following website: [email or web address]

## 2019-07-08 NOTE — PROGRESS NOTE ADULT - ATTENDING COMMENTS
Pt seen and examined. I agree with above assessment and plan. Discharge planning.
patient seen at bedside, agree with above assessment. routine post operative care
patient seen at bedside, agree with above assessment, no complaints, continue routine post partum care

## 2019-07-08 NOTE — DISCHARGE NOTE OB - CARE PROVIDER_API CALL
Shaw Braswell)  Obstetrics and Gynecology  [address] [place] [address], [address]  [place] [address]  Phone: [phone]  Fax: [phone]  Follow Up Time:

## 2019-07-18 ENCOUNTER — APPOINTMENT (OUTPATIENT)
Dept: OBGYN | Facility: CLINIC | Age: 29
End: 2019-07-18
Payer: COMMERCIAL

## 2019-07-18 PROCEDURE — 0503F POSTPARTUM CARE VISIT: CPT

## 2019-08-06 ENCOUNTER — TRANSCRIPTION ENCOUNTER (OUTPATIENT)
Age: 29
End: 2019-08-06

## 2019-08-21 ENCOUNTER — APPOINTMENT (OUTPATIENT)
Dept: OBGYN | Facility: CLINIC | Age: 29
End: 2019-08-21
Payer: COMMERCIAL

## 2019-08-21 PROCEDURE — 0503F POSTPARTUM CARE VISIT: CPT

## 2019-09-14 ENCOUNTER — TRANSCRIPTION ENCOUNTER (OUTPATIENT)
Age: 29
End: 2019-09-14

## 2019-11-21 ENCOUNTER — APPOINTMENT (OUTPATIENT)
Dept: OBGYN | Facility: CLINIC | Age: 29
End: 2019-11-21
Payer: COMMERCIAL

## 2019-11-21 ENCOUNTER — RESULT REVIEW (OUTPATIENT)
Age: 29
End: 2019-11-21

## 2019-11-21 PROCEDURE — 99395 PREV VISIT EST AGE 18-39: CPT

## 2019-12-02 NOTE — PRE-ANESTHESIA EVALUATION ADULT - NSANTHALCOHOLSD_GEN_ALL_CORE
Subjective:          Chief Complaint: Ora Henderson is a 59 y.o. female who had concerns including Pain of the Right Shoulder.    Ora Henderson, a 59 y.o. RHD female, returns today for evaluation of her RIGHT shoulder. Several year history of pain that has worsened over the last few months, no JUSTIN. Reports numbness, tingling, burning, radiating pain throughout right upper extremity. Pain with ADLs above 90. Sees Dr. Tirado for cervical myofascial pain and had trigger point injections to bilateral trapezius, rhomboid, levator scapula 10/29/19. She reports brief improvement in pain following injections. She has been completing a HEP and also takes gabapentin. History of bilateral CTS, EMG 07/26/19. She does medical billing.     Prior 10/02/17  Ora Henderson is a 57 y.o. female here for right shoulder pain. Worsened in July 2017. Did some PT since then with mild improvement.    Shoulder Pain    The pain is present in the right shoulder. This is a chronic problem. The current episode started more than 1 year ago. There has been no history of extremity trauma. The problem has been gradually worsening. The quality of the pain is described as tingling, burning and numbing. The pain is at a severity of 2/10. Associated symptoms include numbness and tingling. Pertinent negatives include no fever. The symptoms are aggravated by activity. She has tried injection treatment, other, cold and heat for the symptoms. The treatment provided mild relief. Physical therapy was effective.      Review of Systems   Constitution: Negative for chills, fever and night sweats.   HENT: Negative for ear discharge and hearing loss.    Eyes: Negative for blurred vision and visual disturbance.   Cardiovascular: Negative for chest pain and leg swelling.   Respiratory: Negative for cough and shortness of breath.    Endocrine: Negative for polyuria.   Hematologic/Lymphatic: Negative for bleeding  problem.   Skin: Negative for rash.   Musculoskeletal: Positive for joint pain and muscle weakness. Negative for back pain, joint swelling and muscle cramps.   Gastrointestinal: Negative for melena, nausea and vomiting.   Genitourinary: Negative for hematuria.   Neurological: Positive for numbness and tingling. Negative for loss of balance and paresthesias.   Psychiatric/Behavioral: Negative for altered mental status.     Past Medical History:   Diagnosis Date    Arthritis     GERD (gastroesophageal reflux disease)     Hepatomegaly     and fatty liver    Hernia, umbilical     reducible    Hyperlipidemia     Hypertension     Sleep apnea      Past Surgical History:   Procedure Laterality Date    BRAIN SURGERY      tumor removal     CARPAL TUNNEL RELEASE Bilateral     CERVICAL BIOPSY  W/ LOOP ELECTRODE EXCISION      [facility] '81     SECTION      x 1    COLONOSCOPY N/A 2016    Procedure: COLONOSCOPY;  Surgeon: Quique Paul MD;  Location: [facility];  Service: Endoscopy;  Laterality: N/A;    CYST REMOVAL Left 2018    HERNIA REPAIR      2016    HYSTERECTOMY  2006    fibroids and endometriosis    REFRACTIVE SURGERY      TOTAL ABDOMINAL HYSTERECTOMY W/ BILATERAL SALPINGOOPHORECTOMY      STAR/BSO secondary to endometriosis    VENTRAL HERNIA REPAIR       Social History     Socioeconomic History    Marital status:      Spouse name: Not on file    Number of children: 1    Years of education: Not on file    Highest education level: Not on file   Occupational History    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     Employer: [employer]   Social Needs    Financial resource strain: Not on file    Food insecurity:     Worry: Not on file     Inability: Not on file    Transportation needs:     Medical: Not on file     Non-medical: Not on file   Tobacco Use    Smoking status: Never Smoker    Smokeless tobacco: Never Used   Substance and Sexual Activity    Alcohol use: Yes      Alcohol/week: 0.0 standard drinks     Comment: socially    Drug use: No    Sexual activity: Yes     Partners: Male     Birth control/protection: None, Surgical   Lifestyle    Physical activity:     Days per week: Not on file     Minutes per session: Not on file    Stress: Not on file   Relationships    Social connections:     Talks on phone: Not on file     Gets together: Not on file     Attends Confucianist service: Not on file     Active member of club or organization: Not on file     Attends meetings of clubs or organizations: Not on file     Relationship status: Not on file   Other Topics Concern    Not on file   Social History Narrative    Not on file     Vitals:    12/02/19 0836   BP: 129/87   Pulse: 79                   Objective:        General: Ora is well-developed, well-nourished, appears stated age, in no acute distress, alert and oriented to time, place and person.     General    Vitals reviewed.  Constitutional: She is oriented to person, place, and time. She appears well-developed and well-nourished. No distress.   HENT:   Head: Atraumatic.   Nose: Nose normal.   Eyes: EOM are normal. Right eye exhibits no discharge. Left eye exhibits no discharge.   Neck: Neck supple.   Cardiovascular: Normal rate and intact distal pulses.    Pulmonary/Chest: Effort normal. No respiratory distress.   Neurological: She is alert and oriented to person, place, and time. Coordination normal.   Psychiatric: She has a normal mood and affect. Her behavior is normal. Judgment and thought content normal.         Right Shoulder Exam     Inspection/Observation   Swelling: absent  Bruising: absent  Scars: absent  Deformity: absent  Scapular Winging: absent  Scapular Dyskinesia: negative  Atrophy: absent    Tenderness   The patient is tender to palpation of the biceps tendon.    Range of Motion   Active abduction: 90   Passive abduction: 100   Extension: 0   Forward Flexion: 160   Forward Elevation: 160Adduction: 40    External Rotation 0 degrees: 50   Internal rotation 0 degrees: T8     Tests & Signs   Drop arm: negative  Foreman test: positive  Impingement: positive  Lift Off Sign: negative  Active Compression Test (Midland's Sign): positive  Speed's Test: positive    Other   Sensation: normal    Left Shoulder Exam     Inspection/Observation   Swelling: absent  Bruising: absent  Scars: absent  Deformity: absent  Scapular Winging: absent  Scapular Dyskinesia: negative  Atrophy: absent    Tenderness   The patient is experiencing no tenderness.     Range of Motion   Active abduction: 90   Passive abduction: 100   Extension: 0   Forward Flexion: 160   Forward Elevation: 160Adduction: 40   External Rotation 0 degrees: 50   Internal rotation 0 degrees: T8     Tests & Signs   Drop arm: negative  Foreman test: negative  Impingement: negative  Lift Off Sign: negative  Active Compression test (Midland's Sign): negative  Speed's Test: negative  Bear Hug: negative    Other   Sensation: normal       Muscle Strength   Right Upper Extremity   Shoulder Abduction: 5/5   Shoulder Internal Rotation: 4/5   Shoulder External Rotation: 4/5   Supraspinatus: 4/5/5   Subscapularis: 5/5/5   Biceps: 5/5/5   Left Upper Extremity  Shoulder Abduction: 5/5   Shoulder Internal Rotation: 5/5   Shoulder External Rotation: 5/5   Supraspinatus: 5/5/5   Subscapularis: 5/5/5   Biceps: 5/5/5     Reflexes     Left Side  Biceps:  2+  Triceps:  2+    Right Side   Biceps:  2+  Triceps:  2+    Vascular Exam     Right Pulses      Radial:                    2+      Left Pulses      Radial:                    2+      Capillary Refill  Right Hand: normal capillary refill  Left Hand: normal capillary refill    Relevant imaging results reviewed and interpreted by me, discussed with the patient and / or family today X-ray Shoulder 2 or More Views Right  Narrative: EXAMINATION:  XR SHOULDER COMPLETE 2 OR MORE VIEWS RIGHT    CLINICAL HISTORY:  Pain in right  No shoulder    TECHNIQUE:  Two or three views of the right shoulder were preformed.    COMPARISON:  10/02/2017    FINDINGS:  No acute fracture or dislocation.  Mild AC joint arthropathy is noted.  Mild-to-moderate degenerative change at the right glenohumeral joint.  Impression: 1.  As above    Electronically signed by: Bowen Montes DO  Date:    11/06/2019  Time:    08:30        Reading Physician Reading Date Result Priority   Parvez Miranda MD 9/23/2017       Narrative     Technique: Standard multiplanar multisequence imaging of the right shoulder was performed.    Findings: There is motion artifact on selected sequences which limits evaluation.    There is a full-thickness tear of the distal supraspinatus tendon with a possible 0.5 cm fluid filled tendon defect demonstrated.  Distal full-thickness or high-grade partial infraspinatus tendon tear also noted with attenuation and poor delineation of the distal tendon fibers.  Fluid and edema extends proximally along the infraspinatus myotendinous junction.  There are chronic tendinosis changes of the subscapularis tendon which is otherwise intact.  Teres minor tendon is unremarkable.  There is contiguous complex fluid within the glenohumeral joint, subacromial subdeltoid bursa, subscapularis recess, and biceps tendon sheath.  Small loose bodies or debris within the aforementioned fluid fluid with concomitant synovitis changes.    There is degenerative osseous and capsular hypertrophy of the acromioclavicular joint.  The acromion is a type II configuration with a concave undersurface.    There is chronic degeneration of the proximal intracapsular course of the biceps tendon and biceps labral anchor.  No discrete labral tear or detachment.  Mild intraosseous cystic or edematous changes regional to the greater tuberosity of the humerus and superior glenoid.      Impression         1.  Full-thickness retracted tear supraspinatus tendon and probable smaller full-thickness  or high-grade partial tear distal infraspinatus tendon.    2.  Degenerative arthropathy of the acromioclavicular joint.    3.  Complex joint and bursal fluid with concomitant synovitis changes.    4.  Chronic degeneration biceps tendon and biceps labral anchor.          Electronically signed by: MARIZOL TAVERA MD  Date: 09/23/17  Time: 16:19               Reading Physician Reading Date Result Priority   Bowen Montes DO 11/6/2019 Routine      Narrative     EXAMINATION:  XR SHOULDER COMPLETE 2 OR MORE VIEWS RIGHT    CLINICAL HISTORY:  Pain in right shoulder    TECHNIQUE:  Two or three views of the right shoulder were preformed.    COMPARISON:  10/02/2017    FINDINGS:  No acute fracture or dislocation.  Mild AC joint arthropathy is noted.  Mild-to-moderate degenerative change at the right glenohumeral joint.      Impression       1.  As above      Electronically signed by: Bowen Montes DO  Date: 11/06/2019  Time: 08:30              Assessment:       Encounter Diagnoses   Name Primary?    Nontraumatic complete tear of right rotator cuff Yes    Arthritis of right acromioclavicular joint     Tendonitis of upper biceps tendon of right shoulder           Plan:       We reviewed with Ora today, the pathology and natural history of her diagnosis. We have discussed a variety of treatment options including medications, physical therapy and other alternative treatments. I also explained the indications, risks and benefits of surgery. After discussion, Ora decided to proceed with surgery. The decision was made to go forward with     right   -Shoulder arthroscopic rotator cuff repair   -Shoulder arthroscopic SAD   -Shoulder arthroscopic DCE   -Shoulder arthroscopic extensive debridement   -Shoulder arthroscopic biceps tenodesis (auto)        The details of the surgical procedure were explained, including the location of probable incisions and a description of likely hardware and/or grafts to be used.  The patient  understands the likely convalescence after surgery.  Also, we have thoroughly discussed the risks, benefits and alternatives to surgery, including, but not limited to, the risk of infection, joint stiffness, blood clot (including DVT and/or pulmonary embolus), neurologic and vascular injury.  It was explained that, if tissue has been repaired or reconstructed, there is a chance of failure, which may require further management.      All of the patient's questions were answered and informed consent was obtained. The patient will contact us if they have any questions or concerns in the interim.                      Disclaimer: This note was prepared using a voice recognition system and is likely to have sound alike errors within the text.

## 2019-12-13 ENCOUNTER — EMERGENCY (EMERGENCY)
Facility: HOSPITAL | Age: 29
LOS: 1 days | Discharge: HOME | End: 2019-12-13
Attending: EMERGENCY MEDICINE
Payer: COMMERCIAL

## 2019-12-13 VITALS
RESPIRATION RATE: 16 BRPM | TEMPERATURE: 98 F | WEIGHT: 186.07 LBS | OXYGEN SATURATION: 99 % | HEIGHT: 65 IN | HEART RATE: 72 BPM | SYSTOLIC BLOOD PRESSURE: 119 MMHG | DIASTOLIC BLOOD PRESSURE: 79 MMHG

## 2019-12-13 DIAGNOSIS — Z98.890 OTHER SPECIFIED POSTPROCEDURAL STATES: Chronic | ICD-10-CM

## 2019-12-13 DIAGNOSIS — Z98.891 HISTORY OF UTERINE SCAR FROM PREVIOUS SURGERY: Chronic | ICD-10-CM

## 2019-12-13 LAB
ALBUMIN SERPL ELPH-MCNC: 4.1 G/DL — SIGNIFICANT CHANGE UP (ref 3.3–5)
ALP SERPL-CCNC: 65 U/L — SIGNIFICANT CHANGE UP (ref 40–120)
ALT FLD-CCNC: 25 U/L — SIGNIFICANT CHANGE UP (ref 10–45)
ANION GAP SERPL CALC-SCNC: 12 MMOL/L — SIGNIFICANT CHANGE UP (ref 5–17)
APTT BLD: 26.5 SEC — LOW (ref 27.5–36.3)
AST SERPL-CCNC: 24 U/L — SIGNIFICANT CHANGE UP (ref 10–40)
BASOPHILS # BLD AUTO: 0.04 K/UL — SIGNIFICANT CHANGE UP (ref 0–0.2)
BASOPHILS NFR BLD AUTO: 0.6 % — SIGNIFICANT CHANGE UP (ref 0–2)
BILIRUB SERPL-MCNC: 0.1 MG/DL — LOW (ref 0.2–1.2)
BUN SERPL-MCNC: 13 MG/DL — SIGNIFICANT CHANGE UP (ref 7–23)
CALCIUM SERPL-MCNC: 9.1 MG/DL — SIGNIFICANT CHANGE UP (ref 8.4–10.5)
CHLORIDE SERPL-SCNC: 103 MMOL/L — SIGNIFICANT CHANGE UP (ref 96–108)
CK MB BLD-MCNC: 1.5 % — SIGNIFICANT CHANGE UP (ref 0–3.5)
CK MB CFR SERPL CALC: 1.9 NG/ML — SIGNIFICANT CHANGE UP (ref 0–3.8)
CK SERPL-CCNC: 128 U/L — SIGNIFICANT CHANGE UP (ref 25–170)
CO2 SERPL-SCNC: 22 MMOL/L — SIGNIFICANT CHANGE UP (ref 22–31)
CREAT SERPL-MCNC: 0.62 MG/DL — SIGNIFICANT CHANGE UP (ref 0.5–1.3)
EOSINOPHIL # BLD AUTO: 0.17 K/UL — SIGNIFICANT CHANGE UP (ref 0–0.5)
EOSINOPHIL NFR BLD AUTO: 2.5 % — SIGNIFICANT CHANGE UP (ref 0–6)
GLUCOSE SERPL-MCNC: 104 MG/DL — HIGH (ref 70–99)
HCG SERPL-ACNC: <2 MIU/ML — SIGNIFICANT CHANGE UP
HCT VFR BLD CALC: 38 % — SIGNIFICANT CHANGE UP (ref 34.5–45)
HGB BLD-MCNC: 12.5 G/DL — SIGNIFICANT CHANGE UP (ref 11.5–15.5)
IMM GRANULOCYTES NFR BLD AUTO: 0.3 % — SIGNIFICANT CHANGE UP (ref 0–1.5)
INR BLD: 0.95 RATIO — SIGNIFICANT CHANGE UP (ref 0.88–1.16)
LYMPHOCYTES # BLD AUTO: 2.78 K/UL — SIGNIFICANT CHANGE UP (ref 1–3.3)
LYMPHOCYTES # BLD AUTO: 41.2 % — SIGNIFICANT CHANGE UP (ref 13–44)
MCHC RBC-ENTMCNC: 29.1 PG — SIGNIFICANT CHANGE UP (ref 27–34)
MCHC RBC-ENTMCNC: 32.9 GM/DL — SIGNIFICANT CHANGE UP (ref 32–36)
MCV RBC AUTO: 88.6 FL — SIGNIFICANT CHANGE UP (ref 80–100)
MONOCYTES # BLD AUTO: 0.88 K/UL — SIGNIFICANT CHANGE UP (ref 0–0.9)
MONOCYTES NFR BLD AUTO: 13 % — SIGNIFICANT CHANGE UP (ref 2–14)
NEUTROPHILS # BLD AUTO: 2.86 K/UL — SIGNIFICANT CHANGE UP (ref 1.8–7.4)
NEUTROPHILS NFR BLD AUTO: 42.4 % — LOW (ref 43–77)
NRBC # BLD: 0 /100 WBCS — SIGNIFICANT CHANGE UP (ref 0–0)
PLATELET # BLD AUTO: 230 K/UL — SIGNIFICANT CHANGE UP (ref 150–400)
POTASSIUM SERPL-MCNC: 4.3 MMOL/L — SIGNIFICANT CHANGE UP (ref 3.5–5.3)
POTASSIUM SERPL-SCNC: 4.3 MMOL/L — SIGNIFICANT CHANGE UP (ref 3.5–5.3)
PROT SERPL-MCNC: 7.7 G/DL — SIGNIFICANT CHANGE UP (ref 6–8.3)
PROTHROM AB SERPL-ACNC: 10.8 SEC — SIGNIFICANT CHANGE UP (ref 10–12.9)
RBC # BLD: 4.29 M/UL — SIGNIFICANT CHANGE UP (ref 3.8–5.2)
RBC # FLD: 14.5 % — SIGNIFICANT CHANGE UP (ref 10.3–14.5)
SODIUM SERPL-SCNC: 137 MMOL/L — SIGNIFICANT CHANGE UP (ref 135–145)
TROPONIN T, HIGH SENSITIVITY RESULT: <6 NG/L — SIGNIFICANT CHANGE UP (ref 0–51)
WBC # BLD: 6.75 K/UL — SIGNIFICANT CHANGE UP (ref 3.8–10.5)
WBC # FLD AUTO: 6.75 K/UL — SIGNIFICANT CHANGE UP (ref 3.8–10.5)

## 2019-12-13 PROCEDURE — 70496 CT ANGIOGRAPHY HEAD: CPT | Mod: 26

## 2019-12-13 PROCEDURE — 0042T: CPT

## 2019-12-13 PROCEDURE — 70498 CT ANGIOGRAPHY NECK: CPT | Mod: 26

## 2019-12-13 PROCEDURE — 70450 CT HEAD/BRAIN W/O DYE: CPT | Mod: 26,59

## 2019-12-13 PROCEDURE — 99220: CPT

## 2019-12-13 PROCEDURE — 71045 X-RAY EXAM CHEST 1 VIEW: CPT | Mod: 26

## 2019-12-13 RX ORDER — ASCORBIC ACID 60 MG
1 TABLET,CHEWABLE ORAL
Qty: 0 | Refills: 0 | DISCHARGE

## 2019-12-13 RX ORDER — ASCORBIC ACID 60 MG
500 TABLET,CHEWABLE ORAL DAILY
Refills: 0 | Status: DISCONTINUED | OUTPATIENT
Start: 2019-12-13 | End: 2019-12-18

## 2019-12-13 RX ORDER — ACETAMINOPHEN 500 MG
975 TABLET ORAL ONCE
Refills: 0 | Status: COMPLETED | OUTPATIENT
Start: 2019-12-13 | End: 2019-12-13

## 2019-12-13 RX ADMIN — Medication 975 MILLIGRAM(S): at 22:30

## 2019-12-13 RX ADMIN — Medication 975 MILLIGRAM(S): at 20:45

## 2019-12-13 NOTE — ED PROVIDER NOTE - CLINICAL SUMMARY MEDICAL DECISION MAKING FREE TEXT BOX
Attending MD Long: 29F presenting with acute onset left arm numbness and subjective weakness, last normal 2pm or 3pm, uncertain. Code stroke Attending MD Long: 29F presenting with acute onset left arm numbness and subjective weakness, last normal 2pm or 3pm, uncertain. Code stroke called on ED MD evaluation. Exam with mildly diminished sensation LUE, slightly limited  strength but no drift, NIHSS 1. Stroke team at bedside, plan for CT/CTA/CTP. Consider complex migraine on ddx, cervical radiculopathy

## 2019-12-13 NOTE — CONSULT NOTE ADULT - ATTENDING COMMENTS
cc: LEFT arm numbness    patient seen and examined with housestaff on 19  reviewed IMAGING with neuroradiology  discussed case with ED team    Briefly, 28 yo RH woman with recent pregnancy, s/p  5 months ago, has been tired and breast feeding 18 pound boy at home. She was lying on couch, sort of sleeping, sort of watching all three children (all <10 years old), when she got up off the couch after breastfeeding and resting, she noticed that her left arm was entirely numb, like she had lay down on it, but her index finger was also painful and all the fingers of the left hand felt thick. She has never had a problem to suggest MS in the past. The numbness has now resolved. She admits to headache, which starts in the trapezius muscles and spreads upwards over the back of her head.    ON EXAM  no jose alejandro weakness, no numbness  no carpal tunnel discomfort  no swelling   all DTRs intact.    LABS  unremarkable  CT-A-19 - no thalamic lesions, no right parietal changes. MRI 19 no CVA on DWI, no T2 changes on FLAIR imaging.    IMPRESSION/PLAN  MECHANICAL COMPRESSION -- resolved. Unlikely to return. no other signs of neuropathy  HEADACHE -- likely muscle-related, with stiffness of trapezius muscles.    DISPO -- no objection to d/c planning. No urgent neurological problems.    total time spent was 40 minutes, of which half in counselling and coordination of care.

## 2019-12-13 NOTE — ED CDU PROVIDER INITIAL DAY NOTE - OBJECTIVE STATEMENT
28 yo F, w/ no known PMH and only medications of vitamins, recent  section in 2019, presenting from home w/ chief complaint of acute onset left arm numbness and subjective weakness in setting of headache, with last known normal between 14:00 and 15:00pm, when she took a nap. Woke up from nap around 15:30pm with the symptoms. Code Stroke called on arrival following MD evaluation. Patient endorses decreased strength in L hand  and L hand pain. Also endorses mild decreased sensation in L face. Denies other symptoms. Denies fever, nuchal rigidity, chest pain, sob, visual changes, speech changes, ab pain, cough, abdominal pain, changes in urine or bowel movements. Had been in normal state of health prior to onset of headache, which came on gradually.  In ED pt was a code stroke - had dec strength in LUE - ct/cta neg. Neuro advised CDU for MRV head and MRI neck r/o venous sinus thrombosis vs cervical radiculopathy.

## 2019-12-13 NOTE — ED PROVIDER NOTE - NEUROLOGICAL, MLM
Alert and oriented, +difference in sensation of L face - otherwise cranial nerves intact. No facial droop or slurred speech. Negative Romberg, intact finger to nose, negative pronator drift. 4/5 L hand  strength, other extremities 5/5 strength.

## 2019-12-13 NOTE — ED ADULT NURSE NOTE - OBJECTIVE STATEMENT
89 yr old female arrived ot the ED from home c/o L arm numbness. pt is 5r months Post partum, has hx of pinched nerves in neck.   left facial numbness, and L hand pain. per pt she awoke at approx 3pm after a nap and felt like her left arm was numb. pt ambulated into ED with a steady gait and is now c/o pain to the second and third digit of L hand and L sided neck pain and a headache. upon assessment pt is a&ox4, speaking clearly answering questions and following commands. pt is weak in L upper extremity, no arm drift noted. strong in RUE and legs andrea. no facial asymmetry noted. lungs clear andrea. respirations are even and unlabored. abd is soft, non tender. pt denies fever, chills. nausea, vomiting, chest pain or sob.

## 2019-12-13 NOTE — ED PROVIDER NOTE - ATTENDING CONTRIBUTION TO CARE
Attending MD Long:  I personally have seen and examined this patient.  Resident note reviewed and agree on plan of care and except where noted.  See HPI, PE, and MDM for details.

## 2019-12-13 NOTE — CONSULT NOTE ADULT - ASSESSMENT
29 year old female, phmx likely migraines,, 5 months s/p 3rd  for 3rd child, pw 3 day hx of sharp pain, intermittent, occiput and vertex, blurry vision, and today, , sudden onset at approximately 3:00 of left hand 2nd digit pain, and tenderness, and "feeling cold", LUE numbness from hand to shoudler.  Patient is breastfeeding.  LUE Sxs began on waking from a nap.  LKN: 12/10 NIHSS: 1 MRS: 0.  CTH negative.  CTA H/N: >>>>>>Sxs improving on serial exams.    Impression: 29 year old female, phmx likely migraines,, 5 months s/p 3rd  for 3rd child, pw 3 day hx of sharp pain, intermittent, occiput and vertex, blurry vision, and today, , sudden onset at approximately 3:00 of left hand 2nd digit pain, and tenderness, and "feeling cold", LUE numbness from hand to shoudler.  Patient is breastfeeding.  LUE Sxs began on waking from a nap.  LKN: 12/10 NIHSS: 1 MRS: 0.  CTH negative.  CTA H/N: negative.  Sxs improving on serial exams.    Impression:  Stroke unlikely but can't be ruled out given LUE focal findings, and recent 29 year old female, phmx likely migraines,, 5 months s/p 3rd  for 3rd child, pw 3 day hx of sharp pain, intermittent, occiput and vertex, blurry vision, and today, , sudden onset at approximately 3:00 of left hand 2nd digit pain, and tenderness, and "feeling cold", LUE numbness from hand to shoudler.  Patient is breastfeeding.  LUE Sxs began on waking from a nap.  LKN: 12/10 NIHSS: 1 MRS: 0.  CTH negative.  CTA H/N and CTP: negative.  Sxs improving on serial exams.    Impression:  Stroke unlikely but can't be ruled out, other secondary headaches on ddx include IIH, tumor, venous sinus thrombusis, although less likely given negative CTV.  [] MRI brain without contrast  [] MRA head and neck  [] MRI C spine   [] 1L NS  [] pain management per primary team if headaches recurs, would consider toradol, magnesium, reglan (if nausea)

## 2019-12-13 NOTE — ED PROVIDER NOTE - CHIEF COMPLAINT
The patient is a 29y Female complaining of The patient is a 29y Female complaining of headache, numbness.

## 2019-12-13 NOTE — ED PROVIDER NOTE - OBJECTIVE STATEMENT
28 yo F, w/ no known PMH and only medications of vitamins, recent  section in 2019, presenting from home w/ chief complaint of acute onset left arm numbness and subjective weakness in setting of headache, with last known normal between 14:00 and 15:00pm, when she took a nap. Woke up from nap around 15:30pm with the symptoms. Code Stroke called on arrival following MD evaluation. Patient endorses decreased strength in L hand  and L hand pain. Also endorses mild decreased sensation in L face. Denies other symptoms. Denies fever, nuchal rigidity, chest pain, sob, visual changes, speech changes, ab pain, cough, abdominal pain, changes in urine or bowel movements. Had been in normal state of health prior to onset of headache, which came on gradually.

## 2019-12-13 NOTE — CONSULT NOTE ADULT - SUBJECTIVE AND OBJECTIVE BOX
HPI:    29 year old female, no sig phmx, 5 months s/p 3rd  for 3rd child, pw holocephalic headache, dull, 5/10,?????? migraine hx,  left hand 2nd digit pain, LUE numbness, LKN 2:30 PM.  Patient is breastfeeding.  Sxs began on waking from a nap.    (Stroke only)  LKN: 2:30 PM  NIHSS: 1  MRS: 0      REVIEW OF SYSTEMS  General:	  Skin/Breast:	  Ophthalmologic:  ENMT:	  Respiratory and Thorax:	  Cardiovascular:	  Gastrointestinal:	  Genitourinary:	  Musculoskeletal:	  Neurological:	  Psychiatric:	  Hematology/Lymphatics:	  Endocrine:	  Allergic/Immunologic:	    A 10-system ROS was performed and is negative except for those items noted above and/or in the HPI.    PAST MEDICAL & SURGICAL HISTORY:  No pertinent past medical history  H/O:  section    FAMILY HISTORY:  No pertinent family history in first degree relatives    SOCIAL HISTORY:   T/E/D:   Occupation:   Lives with:     MEDICATIONS (HOME):  Home Medications:  acetaminophen 325 mg oral tablet: 3 tab(s) orally  (2019 10:08)  ascorbic acid 500 mg oral tablet: 1 tab(s) orally once a day (2019 10:08)  docusate sodium 100 mg oral capsule: 1 cap(s) orally 2 times a day, As needed, Stool Softening (2019 10:11)  ferrous sulfate 325 mg (65 mg elemental iron) oral tablet: 1 tab(s) orally 2 times a day (2019 10:08)  ibuprofen 600 mg oral tablet: 1 tab(s) orally every 6 hours (2019 10:08)  Prenatal Multivitamins oral capsule:  orally  (2019 10:11)  Vitamin C 500 mg oral tablet: 1 tab(s) orally once a day (2019 10:11)    MEDICATIONS  (STANDING):    MEDICATIONS  (PRN):    ALLERGIES/INTOLERANCES:  Allergies  No Known Allergies    Intolerances    VITALS & EXAMINATION:  Vital Signs Last 24 Hrs  T(C): 36.6 (13 Dec 2019 19:37), Max: 36.6 (13 Dec 2019 19:37)  T(F): 97.9 (13 Dec 2019 19:37), Max: 97.9 (13 Dec 2019 19:37)  HR: 72 (13 Dec 2019 19:37) (72 - 72)  BP: 119/79 (13 Dec 2019 19:37) (119/79 - 119/79)  BP(mean): --  RR: 16 (13 Dec 2019 19:37) (16 - 16)  SpO2: 99% (13 Dec 2019 19:37) (99% - 99%)    Neurological Exam    Mental Status:  alert and oriented x3, fluent speech, following commands, repetition and naming intact    Cranial Nerves: PERRL, EOMI without nystagmus, visual fields intact, left V2 decreased sensation, no facial droop, pallate and uvula midline, no dysarthria, head turn strength normal, shoulder shrug strength normal.    Motor:   Tone:   normal                 Strength:    Upper Extremity    De L 5/5 R 5/5  Bi L 5/5 R 5/5  Tr L 5/5 R 5/5  Gr L 5/5 R 5/5    Lower extremity    HF L 5/5 R 5/5  KE L 5/5 R 5/5  KF L 5/5 R 5/5  DF L 5/5 R 5/5  PF L 5/5 R 5/5    Pronator drift: none            Sensation: intact grossly to light touch    Tremor: none appreciated at rest or in action    Deep Tendon Reflexes:     Toes flexor bilaterally:     Dysmetria:    Gait:     LABORATORY:  CBC                       12.5   6.75  )-----------( 230      ( 13 Dec 2019 19:59 )             38.0     Chem       LFTs   Coagulopathy PT/INR - ( 13 Dec 2019 19:59 )   PT: 10.8 sec;   INR: 0.95 ratio         PTT - ( 13 Dec 2019 19:59 )  PTT:26.5 sec  Lipid Panel   A1c   Cardiac enzymes     U/A   CSF  Immunological  Other    STUDIES & IMAGING:  Studies (EKG, EEG, EMG, etc):     Radiology (XR, CT, MR, U/S, TTE/ANDRE): HPI:    29 year old female, phmx likely migraines,, 5 months s/p 3rd  for 3rd child, pw 3 day hx of sharp pain, intermittent, occiput and vertex, blurry vision, and today, , sudden onset at approximately 3:00 of left hand 2nd digit pain, and tenderness, and "feeling cold", LUE numbness from hand to shoudler.  Patient is breastfeeding.  LUE Sxs began on waking from a nap.    (Stroke only)  LKN: 12/10  NIHSS: 1  MRS: 0      REVIEW OF SYSTEMS  General:	  Skin/Breast:	  Ophthalmologic:  ENMT:	  Respiratory and Thorax:	  Cardiovascular:	  Gastrointestinal:	  Genitourinary:	  Musculoskeletal:	  Neurological:	  Psychiatric:	  Hematology/Lymphatics:	  Endocrine:	  Allergic/Immunologic:	    A 10-system ROS was performed and is negative except for those items noted above and/or in the HPI.    PAST MEDICAL & SURGICAL HISTORY:  No pertinent past medical history  H/O:  section    FAMILY HISTORY:  No pertinent family history in first degree relatives    SOCIAL HISTORY:   T/E/D:   Occupation:   Lives with:     MEDICATIONS (HOME):  Home Medications:  acetaminophen 325 mg oral tablet: 3 tab(s) orally  (2019 10:08)  ascorbic acid 500 mg oral tablet: 1 tab(s) orally once a day (2019 10:08)  docusate sodium 100 mg oral capsule: 1 cap(s) orally 2 times a day, As needed, Stool Softening (2019 10:11)  ferrous sulfate 325 mg (65 mg elemental iron) oral tablet: 1 tab(s) orally 2 times a day (2019 10:08)  ibuprofen 600 mg oral tablet: 1 tab(s) orally every 6 hours (2019 10:08)  Prenatal Multivitamins oral capsule:  orally  (2019 10:11)  Vitamin C 500 mg oral tablet: 1 tab(s) orally once a day (2019 10:11)    MEDICATIONS  (STANDING):    MEDICATIONS  (PRN):    ALLERGIES/INTOLERANCES:  Allergies  No Known Allergies    Intolerances    VITALS & EXAMINATION:  Vital Signs Last 24 Hrs  T(C): 36.6 (13 Dec 2019 19:37), Max: 36.6 (13 Dec 2019 19:37)  T(F): 97.9 (13 Dec 2019 19:37), Max: 97.9 (13 Dec 2019 19:37)  HR: 72 (13 Dec 2019 19:37) (72 - 72)  BP: 119/79 (13 Dec 2019 19:37) (119/79 - 119/79)  BP(mean): --  RR: 16 (13 Dec 2019 19:37) (16 - 16)  SpO2: 99% (13 Dec 2019 19:37) (99% - 99%)    Neurological Exam    Mental Status:  alert and oriented x3, fluent speech, following commands, repetition and naming intact    Cranial Nerves: PERRL, EOMI without nystagmus, visual fields intact, left V2 decreased sensation, no facial droop, pallate and uvula midline, no dysarthria, head turn strength normal, shoulder shrug strength normal.    Motor:   Tone:   normal                 Strength:    Upper Extremity    De L 5/5 R 5/5  Bi L 5/5 R 5/5  Tr L 5/5 R 5/5  Gr L 5/5 R 5/5    Lower extremity    HF L 5/5 R 5/5  KE L 5/5 R 5/5  KF L 5/5 R 5/5  DF L 5/5 R 5/5  PF L 5/5 R 5/5    Left Hand: 2nd digit tender to touch, painful on active flexion/extension, no pain on passive flexion/extension, capillary refill <2sec, FROM active of all fingers, but moves slowly.      Pronator drift: none            Sensation: intact grossly to light touch    Tremor: none appreciated at rest or in action    Deep Tendon Reflexes: 2+ throughout    Dysmetria: none ftn, hts      LABORATORY:  CBC                       12.5   6.75  )-----------( 230      ( 13 Dec 2019 19:59 )             38.0     Chem       LFTs   Coagulopathy PT/INR - ( 13 Dec 2019 19:59 )   PT: 10.8 sec;   INR: 0.95 ratio         PTT - ( 13 Dec 2019 19:59 )  PTT:26.5 sec  Lipid Panel   A1c   Cardiac enzymes     U/A   CSF  Immunological  Other    STUDIES & IMAGING:  Studies (EKG, EEG, EMG, etc):     Radiology (XR, CT, MR, U/S, TTE/ANDRE): HPI:    29 year old female, phmx likely migraines, 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  Patient is breastfeeding.  LUE Sxs began on waking from a nap.    (Stroke only)  LKN: 12/10  NIHSS: 1  MRS: 0      REVIEW OF SYSTEMS  General:	  Skin/Breast:	  Ophthalmologic:  ENMT:	  Respiratory and Thorax:	  Cardiovascular:	  Gastrointestinal:	  Genitourinary:	  Musculoskeletal:	  Neurological:	  Psychiatric:	  Hematology/Lymphatics:	  Endocrine:	  Allergic/Immunologic:	    A 10-system ROS was performed and is negative except for those items noted above and/or in the HPI.    PAST MEDICAL & SURGICAL HISTORY:  No pertinent past medical history  H/O:  section    FAMILY HISTORY:  No pertinent family history in first degree relatives    SOCIAL HISTORY:   T/E/D:   Occupation:   Lives with:     MEDICATIONS (HOME):  Home Medications:  acetaminophen 325 mg oral tablet: 3 tab(s) orally  (2019 10:08)  ascorbic acid 500 mg oral tablet: 1 tab(s) orally once a day (2019 10:08)  docusate sodium 100 mg oral capsule: 1 cap(s) orally 2 times a day, As needed, Stool Softening (2019 10:11)  ferrous sulfate 325 mg (65 mg elemental iron) oral tablet: 1 tab(s) orally 2 times a day (2019 10:08)  ibuprofen 600 mg oral tablet: 1 tab(s) orally every 6 hours (2019 10:08)  Prenatal Multivitamins oral capsule:  orally  (2019 10:11)  Vitamin C 500 mg oral tablet: 1 tab(s) orally once a day (2019 10:11)    MEDICATIONS  (STANDING):    MEDICATIONS  (PRN):    ALLERGIES/INTOLERANCES:  Allergies  No Known Allergies    Intolerances    VITALS & EXAMINATION:  Vital Signs Last 24 Hrs  T(C): 36.6 (13 Dec 2019 19:37), Max: 36.6 (13 Dec 2019 19:37)  T(F): 97.9 (13 Dec 2019 19:37), Max: 97.9 (13 Dec 2019 19:37)  HR: 72 (13 Dec 2019 19:37) (72 - 72)  BP: 119/79 (13 Dec 2019 19:37) (119/79 - 119/79)  BP(mean): --  RR: 16 (13 Dec 2019 19:37) (16 - 16)  SpO2: 99% (13 Dec 2019 19:37) (99% - 99%)    Neurological Exam    Mental Status:  alert and oriented x3, fluent speech, following commands, repetition and naming intact    Cranial Nerves: PERRL, EOMI without nystagmus, visual fields intact, left V2 decreased sensation, no facial droop, pallate and uvula midline, no dysarthria, head turn strength normal, shoulder shrug strength normal.    Fundoscopic exam: disc margins sharp b/l    Motor:   Tone:   normal                 Strength:    Upper Extremity    De L 5/5 R 5/5  Bi L 5/5 R 5/5  Tr L 5/5 R 5/5  Gr L 5/5 R 5/5    Lower extremity    HF L 5/5 R 5/5  KE L 5/5 R 5/5  KF L 5/5 R 5/5  DF L 5/5 R 5/5  PF L 5/5 R 5/5    Left Hand: 2nd digit tender to touch, painful on active flexion/extension, no pain on passive flexion/extension, capillary refill <2sec, FROM active of all fingers, but moves slowly.      Pronator drift: none            Sensation: intact grossly to light touch    Tremor: none appreciated at rest or in action    Deep Tendon Reflexes: 2+ throughout    PF reflex: toes down b/l     Dysmetria: none ftn, hts      LABORATORY:  CBC                       12.5   6.75  )-----------( 230      ( 13 Dec 2019 19:59 )             38.0     Chem       LFTs   Coagulopathy PT/INR - ( 13 Dec 2019 19:59 )   PT: 10.8 sec;   INR: 0.95 ratio         PTT - ( 13 Dec 2019 19:59 )  PTT:26.5 sec  Lipid Panel   A1c   Cardiac enzymes     U/A   CSF  Immunological  Other    STUDIES & IMAGING:  Studies (EKG, EEG, EMG, etc):     Radiology (XR, CT, MR, U/S, TTE/ANDRE): HPI:    29 year old female, phmx likely migraines, 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  Patient is breastfeeding.  LUE Sxs began on waking from a nap.    (Stroke only)  LKN: 12/10  NIHSS: 1  MRS: 0      REVIEW OF SYSTEMS  General:	  Skin/Breast:	  Ophthalmologic:  ENMT:	  Respiratory and Thorax:	  Cardiovascular:	  Gastrointestinal:	  Genitourinary:	  Musculoskeletal:	  Neurological:	  Psychiatric:	  Hematology/Lymphatics:	  Endocrine:	  Allergic/Immunologic:	    A 10-system ROS was performed and is negative except for those items noted above and/or in the HPI.    PAST MEDICAL & SURGICAL HISTORY:  No pertinent past medical history  H/O:  section    FAMILY HISTORY:  No pertinent family history in first degree relatives    SOCIAL HISTORY:   T/E/D:   Occupation:   Lives with:     MEDICATIONS (HOME):  Home Medications:  acetaminophen 325 mg oral tablet: 3 tab(s) orally  (2019 10:08)  ascorbic acid 500 mg oral tablet: 1 tab(s) orally once a day (2019 10:08)  docusate sodium 100 mg oral capsule: 1 cap(s) orally 2 times a day, As needed, Stool Softening (2019 10:11)  ferrous sulfate 325 mg (65 mg elemental iron) oral tablet: 1 tab(s) orally 2 times a day (2019 10:08)  ibuprofen 600 mg oral tablet: 1 tab(s) orally every 6 hours (2019 10:08)  Prenatal Multivitamins oral capsule:  orally  (2019 10:11)  Vitamin C 500 mg oral tablet: 1 tab(s) orally once a day (2019 10:11)    MEDICATIONS  (STANDING):    MEDICATIONS  (PRN):    ALLERGIES/INTOLERANCES:  Allergies  No Known Allergies    Intolerances    VITALS & EXAMINATION:  Vital Signs Last 24 Hrs  T(C): 36.6 (13 Dec 2019 19:37), Max: 36.6 (13 Dec 2019 19:37)  T(F): 97.9 (13 Dec 2019 19:37), Max: 97.9 (13 Dec 2019 19:37)  HR: 72 (13 Dec 2019 19:37) (72 - 72)  BP: 119/79 (13 Dec 2019 19:37) (119/79 - 119/79)  BP(mean): --  RR: 16 (13 Dec 2019 19:37) (16 - 16)  SpO2: 99% (13 Dec 2019 19:37) (99% - 99%)    Neurological Exam    Mental Status:  alert and oriented x3, fluent speech, following commands, repetition and naming intact    Cranial Nerves: PERRL, EOMI without nystagmus, visual fields intact, left V2 decreased sensation, no facial droop, pallate and uvula midline, no dysarthria, head turn strength normal, shoulder shrug strength normal.    Fundoscopic exam: disc margins sharp b/l    Motor:   Tone:   normal                 Strength:    Upper Extremity    De L 5/5 R 5/5  Bi L 5/5 R 5/5  Tr L 5/5 R 5/5  Gr L 5/5 R 5/5    Lower extremity    HF L 5/5 R 5/5  KE L 5/5 R 5/5  KF L 5/5 R 5/5  DF L 5/5 R 5/5  PF L 5/5 R 5/5    Left Hand: 2nd digit tender to touch, painful on active flexion/extension, no pain on passive flexion/extension, capillary refill <2sec, FROM active of all fingers, but moves slowly.      Pronator drift: none            Sensation: intact grossly to light touch    Tremor: none appreciated at rest or in action    Deep Tendon Reflexes: 2+ throughout    PF reflex: toes down b/l     Dysmetria: none ftn, hts      LABORATORY:  CBC                       12.5   6.75  )-----------( 230      ( 13 Dec 2019 19:59 )             38.0     Chem       LFTs   Coagulopathy PT/INR - ( 13 Dec 2019 19:59 )   PT: 10.8 sec;   INR: 0.95 ratio         PTT - ( 13 Dec 2019 19:59 )  PTT:26.5 sec  Lipid Panel   A1c   Cardiac enzymes     U/A   CSF  Immunological  Other    STUDIES & IMAGING:  Studies (EKG, EEG, EMG, etc):     Radiology (XR, CT, MR, U/S, TTE/ANDRE):          < from: CT Perfusion w/ Maps w/ IV Cont (19 @ 20:48) >  Impression:    No perfusion abnormality    < end of copied text >  < from: CT Angio Neck w/ IV Cont (19 @ 20:48) >  IMPRESSION:     CTA BRAIN: Patent intracranial circulation. No flow-limiting stenosis or   occlusion.     CTA NECK: Patent cervical vasculature. No flow limiting stenosis or   occlusion.     < end of copied text >

## 2019-12-13 NOTE — ED CDU PROVIDER INITIAL DAY NOTE - ATTENDING CONTRIBUTION TO CARE
Attending MD Long:   I personally have seen and examined this patient.  Physician assistant note reviewed and agree on plan of care and except where noted.  See HPI, PE, and MDM for details.

## 2019-12-14 VITALS
HEART RATE: 72 BPM | DIASTOLIC BLOOD PRESSURE: 72 MMHG | TEMPERATURE: 98 F | SYSTOLIC BLOOD PRESSURE: 104 MMHG | RESPIRATION RATE: 18 BRPM | OXYGEN SATURATION: 98 %

## 2019-12-14 PROCEDURE — 70551 MRI BRAIN STEM W/O DYE: CPT | Mod: 26

## 2019-12-14 PROCEDURE — 85027 COMPLETE CBC AUTOMATED: CPT

## 2019-12-14 PROCEDURE — 82553 CREATINE MB FRACTION: CPT

## 2019-12-14 PROCEDURE — 0042T: CPT

## 2019-12-14 PROCEDURE — 85610 PROTHROMBIN TIME: CPT

## 2019-12-14 PROCEDURE — 82550 ASSAY OF CK (CPK): CPT

## 2019-12-14 PROCEDURE — 71045 X-RAY EXAM CHEST 1 VIEW: CPT

## 2019-12-14 PROCEDURE — 82962 GLUCOSE BLOOD TEST: CPT

## 2019-12-14 PROCEDURE — 70496 CT ANGIOGRAPHY HEAD: CPT

## 2019-12-14 PROCEDURE — 93005 ELECTROCARDIOGRAM TRACING: CPT

## 2019-12-14 PROCEDURE — 70545 MR ANGIOGRAPHY HEAD W/DYE: CPT

## 2019-12-14 PROCEDURE — 72141 MRI NECK SPINE W/O DYE: CPT

## 2019-12-14 PROCEDURE — 84484 ASSAY OF TROPONIN QUANT: CPT

## 2019-12-14 PROCEDURE — 99282 EMERGENCY DEPT VISIT SF MDM: CPT | Mod: GC

## 2019-12-14 PROCEDURE — 99285 EMERGENCY DEPT VISIT HI MDM: CPT | Mod: 25

## 2019-12-14 PROCEDURE — G0378: CPT

## 2019-12-14 PROCEDURE — 72141 MRI NECK SPINE W/O DYE: CPT | Mod: 26

## 2019-12-14 PROCEDURE — 84702 CHORIONIC GONADOTROPIN TEST: CPT

## 2019-12-14 PROCEDURE — 85730 THROMBOPLASTIN TIME PARTIAL: CPT

## 2019-12-14 PROCEDURE — 70450 CT HEAD/BRAIN W/O DYE: CPT

## 2019-12-14 PROCEDURE — 99217: CPT

## 2019-12-14 PROCEDURE — 80053 COMPREHEN METABOLIC PANEL: CPT

## 2019-12-14 PROCEDURE — 70545 MR ANGIOGRAPHY HEAD W/DYE: CPT | Mod: 26,59

## 2019-12-14 PROCEDURE — 70551 MRI BRAIN STEM W/O DYE: CPT

## 2019-12-14 PROCEDURE — 70498 CT ANGIOGRAPHY NECK: CPT

## 2019-12-14 NOTE — ED CDU PROVIDER DISPOSITION NOTE - PATIENT PORTAL LINK FT
You can access the FollowMyHealth Patient Portal offered by [facility] by registering at the following website: [email or web address]. By joining [facility]’s FollowMyHealth portal, you will also be able to view your health information using other applications (apps) compatible with our system.

## 2019-12-14 NOTE — ED CDU PROVIDER SUBSEQUENT DAY NOTE - ATTENDING CONTRIBUTION TO CARE
30yo female 5 months postpartum p/w left UE numbness and weakness upon waking from a nap, code stroke in ED, CTA neg, neuro recommended MRV and MRI neck r/o venous thrombosis and radiculopathy. On my initial exam, pt AAOx4 with unremarkable neuro exam with normal strength. Pending further imaging and continued neuro checks in CDU.

## 2019-12-14 NOTE — ED CDU PROVIDER DISPOSITION NOTE - ATTENDING CONTRIBUTION TO CARE
28yo female 5 months postpartum p/w left UE numbness and weakness upon waking from a nap, code stroke in ED, CTA neg, neuro recommended MRV and MRI neck r/o venous thrombosis and radiculopathy. On my initial exam, pt AAOx4 with unremarkable neuro exam with normal strength. MRV in CDU with frontal lobe lesion, pt made aware and agreed with outpatient follow up. Symptoms improved prior to discharge. Patient informed of CDU visit findings, understands plan.  Patient provided with written and further verbal instructions not included in discharge paperwork.  Patient instructed to follow up with their primary care physician in 2-3 days and return for new, worsened, or persistent symptoms.

## 2019-12-14 NOTE — ED CDU PROVIDER SUBSEQUENT DAY NOTE - PROGRESS NOTE DETAILS
CDU PROGRESS NOTE COMFORT ENAMORADO: Pt resting comfortably without complaint. No headache. NAD. VSS. Neuro exam consistent with earlier LUE 4/5 strength, otherwise neuro intact. No events on tele. Will continue to monitor. CDU NOTE COMFORT Ferguson: VSS NAD. Patient is resting comfortably and is without any complaints. MRI called asking about orders. From ED pt ordered for MRA neck w/ iv, mr head non-con, mr orbit face and or neck and mrv w/ iv these orders do not follow recs of neuro who rec mra head and neck, mr head and mr c-spine. Spoke to neuro resident and asked to change note, note states neg ctv. pt had cta head in neck in ED. Confirmed orders for MR head non con, MR c-spine and MRV w/ iv contrast and confirmed w/ rads as well CDU NOTE COMFORT Ferguson: VSS NAD. Patient is resting comfortably and is without any complaints. MRIs perfomred. MRI head with small nonspecific foci in frontal white matter, MRV neg and MR c-spine w/ degenerative changes. Pt seen and evaluated by neuro and reviewed MRI w/ neuro team. State findingins non-specific. Pt can be dc to follow up outpt. Dr. Rodriguez has seen and evaluated pt and agrees w/ plan

## 2019-12-14 NOTE — ED CDU PROVIDER DISPOSITION NOTE - CARE PROVIDER_API CALL
Ezekiel Kurtz)  Neurology  [address] [place] [address]  Phone: [phone]  Fax: [phone]  Follow Up Time:

## 2019-12-14 NOTE — ED ADULT NURSE REASSESSMENT NOTE - COMFORT CARE
repositioned/ambulated to bathroom/plan of care explained/darkened lights/po fluids offered/warm blanket provided
ambulated to bathroom

## 2019-12-14 NOTE — ED ADULT NURSE REASSESSMENT NOTE - NSIMPLEMENTINTERV_GEN_ALL_ED
Implemented All Universal Safety Interventions:  [place] to call system. Call bell, personal items and telephone within reach. Instruct patient to call for assistance. Room bathroom lighting operational. Non-slip footwear when patient is off stretcher. Physically safe environment: no spills, clutter or unnecessary equipment. Stretcher in lowest position, wheels locked, appropriate side rails in place.

## 2019-12-14 NOTE — ED ADULT NURSE REASSESSMENT NOTE - GENERAL PATIENT STATE
comfortable appearance/cooperative/family/SO at bedside/smiling/interactive/resting/sleeping
comfortable appearance

## 2019-12-14 NOTE — ED CDU PROVIDER DISPOSITION NOTE - NSFOLLOWUPINSTRUCTIONS_ED_ALL_ED_FT
1. stay hydrated.  2. continue current home medications  3. Follow up with your PCP in1 -2 days.  4. Follow up with Neurology #[phone] in 2-3 days.  5. Stroke Education given to you.  6. Return if symptoms worsen, fever, weakness, numbness, dizziness, vision change, slurred speech and all other concerns 1. Stay hydrated.  2. For any headaches take Tylenol 1g every 6 hours   3. Follow up with your PCP in 2-3 days.  4. Follow up with Neurology for further evaluation of your symptoms at [phone] in 3-5 days for reevaluation and further work-up   5. Return if symptoms worsen, fever, weakness, numbness, dizziness, vision change, slurred speech and all other concerns

## 2019-12-14 NOTE — ED CDU PROVIDER DISPOSITION NOTE - CLINICAL COURSE
30 yo F, w/ no known PMH and only medications of vitamins, recent  section in 2019, presenting from home w/ chief complaint of acute onset left arm numbness and subjective weakness in setting of headache, with last known normal between 14:00 and 15:00pm, when she took a nap. Woke up from nap around 15:30pm with the symptoms. Code Stroke called on arrival following MD evaluation. Patient endorses decreased strength in L hand  and L hand pain. Also endorses mild decreased sensation in L face. Denies other symptoms. Denies fever, nuchal rigidity, chest pain, sob, visual changes, speech changes, ab pain, cough, abdominal pain, changes in urine or bowel movements. Had been in normal state of health prior to onset of headache, which came on gradually.  In ED pt was a code stroke - had dec strength in LUE - ct/cta neg. Neuro advised CDU for MRV head and MRI neck r/o venous sinus thrombosis vs cervical radiculopathy.  In CDU, - 28 yo F, w/ no known PMH and only medications of vitamins, recent  section in 2019, presenting from home w/ chief complaint of acute onset left arm numbness and subjective weakness in setting of headache, with last known normal between 14:00 and 15:00pm, when she took a nap. Woke up from nap around 15:30pm with the symptoms. Code Stroke called on arrival following MD evaluation. Patient endorses decreased strength in L hand  and L hand pain. Also endorses mild decreased sensation in L face. Denies other symptoms. Denies fever, nuchal rigidity, chest pain, sob, visual changes, speech changes, ab pain, cough, abdominal pain, changes in urine or bowel movements. Had been in normal state of health prior to onset of headache, which came on gradually.  In ED pt was a code stroke - had dec strength in LUE - ct/cta neg. Neuro advised CDU for MRV head and MRI neck r/o venous sinus thrombosis vs cervical radiculopathy.  In CDU, pt had MRIs as intended. MRIs performed. MRI head with small nonspecific foci in frontal white matter, MRV neg and MR c-spine w/ degenerative changes. Pt seen and evaluated by neuro and reviewed MRI w/ neuro team. State finding non-specific. Pt can be dc to follow up outpt. Dr. Rodriguez has seen and evaluated pt and agrees w/ plan

## 2019-12-14 NOTE — ED ADULT NURSE REASSESSMENT NOTE - NS ED NURSE REASSESS COMMENT FT1
pt ambulating to bathroom with a steady gait. no distress noted
pt being seen by CDU Pa.
07.00 Am Received pt from RUBEN Norris  Pt is observed for headache  &  le, received pt alert and responsive, oriented x4, denies any respiratory distress, SOB, or difficulty breathing N/V/D F/C CP SOB  IV in place, patent and free of signs of infiltration,  V/S stable, pt afebrile, . Pt educated on unit and unit rules, instructed patient to notify RN of any needed assistance, Pt verbalizes understanding, Call bell placed within reach. Safety maintained. Will continue to monitor.  13.00  Pt is evaluated by the CDU MD Michael Silva with all the results.  Pt feeling better Pt Discharged ML out COMFORT Ferguson explained the follow up care & gave the discharge paper Pt verbalized the  understanding on follow up care   Pt stable to go home  Pt has  steady gait stable vitals A&OX 4  at the time of discharge
Received pt from RUBEN Willis from ER , received pt alert and responsive, oriented x4, denies any respiratory distress, SOB, or difficulty breathing. Pt transferred to CDU for MRI/MRV. Pt has c/o L index finger pain and weakness to L hand and c/o decreased sensation to L face. Declined pain medication at this time, will reassess. On telemetry pt is SR on monitor. Pending MRI/MRV in Am pt aware of plan.  IV in place, patent and free of signs of infiltration, pt denies chest pain or palpitations, V/S stable, pt afebrile, pt denies pain at this time. Pt educated on unit and unit rules, instructed patient to notify RN of any needed assistance, Pt verbalizes understanding, Call bell placed within reach. Safety maintained. Will continue to monitor. Lactation pump provided to pt for use.

## 2019-12-26 ENCOUNTER — INBOUND DOCUMENT (OUTPATIENT)
Age: 29
End: 2019-12-26

## 2020-02-03 NOTE — ED ADULT NURSE NOTE - NSFALLRSKASSESSTYPE_ED_ALL_ED
[FreeTextEntry1] : Denies Chest Pain, SOB, Dizziness, Leg edema, Orthopnea, PND, Palpitations, Syncope, BEASLEY, Diaphoresis.\par  Initial (On Arrival)

## 2020-09-03 NOTE — ED ADULT NURSE NOTE - GASTROINTESTINAL WDL
Subjective:      History was provided by the mother.    Annika Mustafa is a 9 year old female who is brought in for this well-child visit.    Immunization History   Administered Date(s) Administered   • DTaP(INFANRIX) 03/08/2013   • DTaP/Hep B/IPV 2011, 01/06/2012, 03/02/2012   • DTaP/IPV 09/02/2016   • Hepatitis A - Adult 09/07/2012, 03/08/2013   • Hib (PRP-OMP) 2011, 01/06/2012, 12/07/2012   • Influenza, Unspecified Formulation 03/02/2012, 04/06/2012, 10/03/2012, 11/01/2013, 10/10/2014, 10/04/2015, 10/22/2016, 10/28/2017, 10/13/2018   • Influenza, injectable, quadrivalent 10/15/2019   • MMR 12/07/2012   • Measles Mumps Rubella Varicella 09/02/2016   • Pneumococcal Conjugate 13 valent 2011, 01/06/2012, 03/02/2012, 09/07/2012   • Rotavirus - monovalent 2011, 01/06/2012   • Varicella 12/07/2012     Annika has no past medical history on file.  Annika has Routine infant or child health check on their problem list.  Annika has no past surgical history on file.  Her family history is not on file.  Annika reports that she has never smoked. She has never used smokeless tobacco.  Annika has a current medication list which includes the following prescription(s): amoxicillin-clavulanate.  Annika   Current Outpatient Medications   Medication Sig Dispense Refill   • amoxicillin-clavulanate (AUGMENTIN) 400-57 MG/5ML suspension Take 8 ml twice daily for 10 days 1 Bottle 0     No current facility-administered medications for this visit.      Annika has No Known Allergies.    Current Issues:  Current concerns include none.    Review of Nutrition:  Current diet: good  Balanced diet? yes    Social Screening:  Discipline concerns? no  Concerns regarding behavior with peers? no  School performance: doing well; no concerns  Secondhand smoke exposure? no    Screening Questions:  Risk factors for anemia: no  Risk factors for tuberculosis: no  Risk factors for dyslipidemia: no     Objective:   Blood pressure (!) 92/56, pulse  88, temperature 98.2 °F (36.8 °C), temperature source Temporal, height 4' 7.71\" (1.415 m), weight 27.9 kg (61 lb 6.4 oz), SpO2 100 %.        Growth parameters are noted and are appropriate for age.    General:   alert, cooperative and no distress   Gait:   normal   Skin:   normal, warm and dry, with normal turgor   Oral cavity:   lips, mucosa, and tongue normal; teeth and gums normal   Eyes:   sclerae white, pupils equal and reactive, red reflex normal bilaterally   Ears:   normal bilaterally   Neck:   no adenopathy, no carotid bruit, no JVD, supple, symmetrical, trachea midline and thyroid not enlarged, symmetric, no tenderness/mass/nodules   Lungs:  clear to auscultation bilaterally   Heart:   regular rate and rhythm, S1, S2 normal, no murmur, click, rub or gallop   Abdomen:  Soft, non-tender; bowel sounds normal; no masses, no hepatosplenomegaly   :  exam deferred   Charli stage:   deferred   Extremities:  extremities normal, atraumatic, no cyanosis or edema   Neuro:  normal without focal findings, mental status, speech normal, alert and oriented x3, LEONEL and reflexes normal and symmetric     Assessment:     Healthy 9 year old female child.     Plan:     1. Anticipatory guidance discussed.  Gave handout on well-child issues at this age.    2.  Weight management:  The patient was counseled regarding nutrition and physical activity.    3. Development: appropriate for age    4. Immunizations today: per orders.  History of previous adverse reactions to immunizations? no    5. Follow-up visit in 1 year for next well child visit, or sooner as needed.    Emiliano Charles MD     Abdomen soft, nontender, nondistended, bowel sounds present in all 4 quadrants.

## 2020-10-08 NOTE — ED PROVIDER NOTE - CROS ED CONS ALL NEG
Attempted postop call.  Left message for patient to return call to office.              
negative...

## 2021-01-04 ENCOUNTER — APPOINTMENT (OUTPATIENT)
Dept: OBGYN | Facility: CLINIC | Age: 31
End: 2021-01-04
Payer: COMMERCIAL

## 2021-01-04 ENCOUNTER — RESULT REVIEW (OUTPATIENT)
Age: 31
End: 2021-01-04

## 2021-01-04 PROCEDURE — 99072 ADDL SUPL MATRL&STAF TM PHE: CPT

## 2021-01-04 PROCEDURE — 99395 PREV VISIT EST AGE 18-39: CPT

## 2021-03-12 ENCOUNTER — TRANSCRIPTION ENCOUNTER (OUTPATIENT)
Age: 31
End: 2021-03-12

## 2021-04-08 ENCOUNTER — TRANSCRIPTION ENCOUNTER (OUTPATIENT)
Age: 31
End: 2021-04-08

## 2021-04-16 ENCOUNTER — TRANSCRIPTION ENCOUNTER (OUTPATIENT)
Age: 31
End: 2021-04-16

## 2021-05-18 NOTE — PATIENT PROFILE OB - AMNIOTIC FLUID AMOUNT, LABOR
Quality 130: Documentation Of Current Medications In The Medical Record: Current Medications Documented Detail Level: Detailed Quality 402: Tobacco Use And Help With Quitting Among Adolescents: Patient screened for tobacco and never smoked within normal limits

## 2021-06-21 ENCOUNTER — TRANSCRIPTION ENCOUNTER (OUTPATIENT)
Age: 31
End: 2021-06-21

## 2021-06-25 ENCOUNTER — TRANSCRIPTION ENCOUNTER (OUTPATIENT)
Age: 31
End: 2021-06-25

## 2021-11-17 ENCOUNTER — TRANSCRIPTION ENCOUNTER (OUTPATIENT)
Age: 31
End: 2021-11-17

## 2022-01-11 ENCOUNTER — TRANSCRIPTION ENCOUNTER (OUTPATIENT)
Age: 32
End: 2022-01-11

## 2022-01-15 ENCOUNTER — TRANSCRIPTION ENCOUNTER (OUTPATIENT)
Age: 32
End: 2022-01-15

## 2022-02-08 ENCOUNTER — TRANSCRIPTION ENCOUNTER (OUTPATIENT)
Age: 32
End: 2022-02-08

## 2022-02-10 ENCOUNTER — TRANSCRIPTION ENCOUNTER (OUTPATIENT)
Age: 32
End: 2022-02-10

## 2022-05-19 ENCOUNTER — NON-APPOINTMENT (OUTPATIENT)
Age: 32
End: 2022-05-19

## 2022-05-24 ENCOUNTER — NON-APPOINTMENT (OUTPATIENT)
Age: 32
End: 2022-05-24

## 2022-09-22 ENCOUNTER — NON-APPOINTMENT (OUTPATIENT)
Age: 32
End: 2022-09-22

## 2022-11-11 ENCOUNTER — NON-APPOINTMENT (OUTPATIENT)
Age: 32
End: 2022-11-11

## 2023-01-11 ENCOUNTER — NON-APPOINTMENT (OUTPATIENT)
Age: 33
End: 2023-01-11

## 2023-01-26 ENCOUNTER — NON-APPOINTMENT (OUTPATIENT)
Age: 33
End: 2023-01-26

## 2023-02-03 NOTE — ED PROVIDER NOTE - NEUROLOGICAL, MLM
"Initial BP (!) 142/82   Pulse 59   Temp 97.4  F (36.3  C) (Tympanic)   Resp 16   Ht 1.727 m (5' 8\")   Wt 93 kg (205 lb)   SpO2 97%   BMI 31.17 kg/m   Estimated body mass index is 31.17 kg/m  as calculated from the following:    Height as of this encounter: 1.727 m (5' 8\").    Weight as of this encounter: 93 kg (205 lb). .      " Alert and oriented, no focal deficits, no motor or sensory deficits.

## 2023-03-08 NOTE — STROKE CODE NOTE - NIH STROKE SCALE: TOTAL
Called patient regarding message below. Patient requested to cancel all three appointments. She does not want to pay for them at this time. She will reach out to [facility] staff when she would like to reschedule.     ----- Message from Luciana Tolentino sent at 3/8/2023  1:39 PM CST -----  Pt stated she couldn't get off work for the appt on tomorrow and is requesting a call back concerning another appt. Call back number is [phone]  Thx jm    
1

## 2023-03-24 ENCOUNTER — NON-APPOINTMENT (OUTPATIENT)
Age: 33
End: 2023-03-24

## 2023-06-16 ENCOUNTER — NON-APPOINTMENT (OUTPATIENT)
Age: 33
End: 2023-06-16

## 2023-09-10 ENCOUNTER — NON-APPOINTMENT (OUTPATIENT)
Age: 33
End: 2023-09-10

## 2023-09-23 ENCOUNTER — NON-APPOINTMENT (OUTPATIENT)
Age: 33
End: 2023-09-23

## 2023-09-25 NOTE — ED ADULT NURSE NOTE - NS ED NURSE DC INFO COMPLEXITY
Attending Only
Patient asked questions/Returned Demonstration/Simple: Patient demonstrates quick and easy understanding/Verbalized Understanding

## 2023-09-29 ENCOUNTER — NON-APPOINTMENT (OUTPATIENT)
Age: 33
End: 2023-09-29

## 2023-11-01 NOTE — ED CDU PROVIDER SUBSEQUENT DAY NOTE - CARDIAC, MLM
"No acute events overnight.  Pain controlled.  Resting in bed.    Vital Signs  Temp: 97.9 °F (36.6 °C)  Temp Source: Oral  Pulse: 70  Heart Rate Source: Monitor  Resp: 18  SpO2: 98 %  Pulse Oximetry Type: Intermittent  Flow (L/min): 10  Oxygen Concentration (%): 70  Device (Oxygen Therapy): room air  BP: 131/60  BP Location: Left arm  BP Method: Automatic  Patient Position: Lying  Height and Weight  Height: 5' 6" (167.6 cm)  Height Method: Stated  Weight: 77.2 kg (170 lb 3.1 oz)  Weight Method: Standard Scale  BSA (Calculated - sq m): 1.9 sq meters  BMI (Calculated): 27.5  Weight in (lb) to have BMI = 25: 154.6]    +FHL/EHL  Brisk capillary refill distally  Dressing c/d/i  Sensation intact to light touch distally    Recent Lab Results         11/01/23  0525   10/31/23  1125   10/31/23  0932        Anion Gap 7.0           BUN 14.1           BUN/CREAT RATIO 16           Calcium 8.6           Chloride 107           CO2 25           Creatinine 0.87           eGFR >60           Glucose 135           Hematocrit 34.1   41.3         Hemoglobin 11.4   13.6         MCH 36.9           MCHC 33.4           .4           MPV 10.2           nRBC 0.0           Platelet Count 75           POCT Glucose     99       Potassium 4.3           RBC 3.09           RDW 14.1           Sodium 139           WBC 10.13                   A/P:  Status post right TKA  Overall patient doing well.  Therapy for mobility and ambulation.  eliquis for DVT PPx   " Normal rate, regular rhythm.  Heart sounds S1, S2.  No murmurs, rubs or gallops.

## 2023-12-15 ENCOUNTER — NON-APPOINTMENT (OUTPATIENT)
Age: 33
End: 2023-12-15

## 2024-01-25 ENCOUNTER — NON-APPOINTMENT (OUTPATIENT)
Age: 34
End: 2024-01-25

## 2024-05-23 NOTE — ED PROVIDER NOTE - CROS ED GI ALL NEG
05/23/24                            Vangie DORCAS Brandon  [address] [address]    To Whom It May Concern:    This is to certify Vangie TOSCANO Brandon was evaluated with Will Quiroz DO on 05/23/24 and can return to regular work on 6/4/24.  No additional follow up needed unless symptoms change or worsen.     RESTRICTIONS: None            Signed by:  Will Quiroz DO  [address] [place]  [facility] [address]  Dept Phone: [phone]       
negative...

## 2024-06-12 ENCOUNTER — NON-APPOINTMENT (OUTPATIENT)
Age: 34
End: 2024-06-12

## 2024-07-11 ENCOUNTER — EMERGENCY (EMERGENCY)
Facility: HOSPITAL | Age: 34
LOS: 1 days | Discharge: HOME | End: 2024-07-11
Attending: STUDENT IN AN ORGANIZED HEALTH CARE EDUCATION/TRAINING PROGRAM
Payer: MEDICAID

## 2024-07-11 VITALS
WEIGHT: 203.93 LBS | HEART RATE: 89 BPM | OXYGEN SATURATION: 99 % | DIASTOLIC BLOOD PRESSURE: 82 MMHG | TEMPERATURE: 98 F | SYSTOLIC BLOOD PRESSURE: 120 MMHG | HEIGHT: 65 IN | RESPIRATION RATE: 19 BRPM

## 2024-07-11 VITALS
RESPIRATION RATE: 18 BRPM | OXYGEN SATURATION: 100 % | HEART RATE: 79 BPM | DIASTOLIC BLOOD PRESSURE: 77 MMHG | TEMPERATURE: 99 F | SYSTOLIC BLOOD PRESSURE: 120 MMHG

## 2024-07-11 DIAGNOSIS — Z98.891 HISTORY OF UTERINE SCAR FROM PREVIOUS SURGERY: Chronic | ICD-10-CM

## 2024-07-11 DIAGNOSIS — Z98.890 OTHER SPECIFIED POSTPROCEDURAL STATES: Chronic | ICD-10-CM

## 2024-07-11 LAB
ALBUMIN SERPL ELPH-MCNC: 4.2 G/DL — SIGNIFICANT CHANGE UP (ref 3.3–5)
ALP SERPL-CCNC: 54 U/L — SIGNIFICANT CHANGE UP (ref 40–120)
ALT FLD-CCNC: 16 U/L — SIGNIFICANT CHANGE UP (ref 10–45)
ANION GAP SERPL CALC-SCNC: 14 MMOL/L — SIGNIFICANT CHANGE UP (ref 5–17)
APTT BLD: 31 SEC — SIGNIFICANT CHANGE UP (ref 24.5–35.6)
AST SERPL-CCNC: 18 U/L — SIGNIFICANT CHANGE UP (ref 10–40)
BASOPHILS # BLD AUTO: 0.04 K/UL — SIGNIFICANT CHANGE UP (ref 0–0.2)
BASOPHILS NFR BLD AUTO: 0.5 % — SIGNIFICANT CHANGE UP (ref 0–2)
BILIRUB SERPL-MCNC: 0.2 MG/DL — SIGNIFICANT CHANGE UP (ref 0.2–1.2)
BUN SERPL-MCNC: 10 MG/DL — SIGNIFICANT CHANGE UP (ref 7–23)
CALCIUM SERPL-MCNC: 9 MG/DL — SIGNIFICANT CHANGE UP (ref 8.4–10.5)
CHLORIDE SERPL-SCNC: 104 MMOL/L — SIGNIFICANT CHANGE UP (ref 96–108)
CO2 SERPL-SCNC: 19 MMOL/L — LOW (ref 22–31)
CREAT SERPL-MCNC: 0.71 MG/DL — SIGNIFICANT CHANGE UP (ref 0.5–1.3)
EGFR: 114 ML/MIN/1.73M2 — SIGNIFICANT CHANGE UP
EGFR: 114 ML/MIN/1.73M2 — SIGNIFICANT CHANGE UP
EOSINOPHIL # BLD AUTO: 0.23 K/UL — SIGNIFICANT CHANGE UP (ref 0–0.5)
EOSINOPHIL NFR BLD AUTO: 2.7 % — SIGNIFICANT CHANGE UP (ref 0–6)
GLUCOSE SERPL-MCNC: 143 MG/DL — HIGH (ref 70–99)
HCG SERPL-ACNC: <2 MIU/ML — SIGNIFICANT CHANGE UP
HCT VFR BLD CALC: 37.2 % — SIGNIFICANT CHANGE UP (ref 34.5–45)
HGB BLD-MCNC: 12.8 G/DL — SIGNIFICANT CHANGE UP (ref 11.5–15.5)
IMM GRANULOCYTES NFR BLD AUTO: 0.1 % — SIGNIFICANT CHANGE UP (ref 0–0.9)
INR BLD: 1.02 RATIO — SIGNIFICANT CHANGE UP (ref 0.85–1.18)
LIDOCAIN IGE QN: 26 U/L — SIGNIFICANT CHANGE UP (ref 7–60)
LYMPHOCYTES # BLD AUTO: 2.13 K/UL — SIGNIFICANT CHANGE UP (ref 1–3.3)
LYMPHOCYTES # BLD AUTO: 25.1 % — SIGNIFICANT CHANGE UP (ref 13–44)
MCHC RBC-ENTMCNC: 29.8 PG — SIGNIFICANT CHANGE UP (ref 27–34)
MCHC RBC-ENTMCNC: 34.4 GM/DL — SIGNIFICANT CHANGE UP (ref 32–36)
MCV RBC AUTO: 86.7 FL — SIGNIFICANT CHANGE UP (ref 80–100)
MONOCYTES # BLD AUTO: 0.98 K/UL — HIGH (ref 0–0.9)
MONOCYTES NFR BLD AUTO: 11.5 % — SIGNIFICANT CHANGE UP (ref 2–14)
NEUTROPHILS # BLD AUTO: 5.1 K/UL — SIGNIFICANT CHANGE UP (ref 1.8–7.4)
NEUTROPHILS NFR BLD AUTO: 60.1 % — SIGNIFICANT CHANGE UP (ref 43–77)
NRBC # BLD: 0 /100 WBCS — SIGNIFICANT CHANGE UP (ref 0–0)
NRBC BLD-RTO: 0 /100 WBCS — SIGNIFICANT CHANGE UP (ref 0–0)
PLATELET # BLD AUTO: 232 K/UL — SIGNIFICANT CHANGE UP (ref 150–400)
POTASSIUM SERPL-MCNC: 3.8 MMOL/L — SIGNIFICANT CHANGE UP (ref 3.5–5.3)
POTASSIUM SERPL-SCNC: 3.8 MMOL/L — SIGNIFICANT CHANGE UP (ref 3.5–5.3)
PROT SERPL-MCNC: 7.1 G/DL — SIGNIFICANT CHANGE UP (ref 6–8.3)
PROTHROM AB SERPL-ACNC: 10.7 SEC — SIGNIFICANT CHANGE UP (ref 9.5–13)
RBC # BLD: 4.29 M/UL — SIGNIFICANT CHANGE UP (ref 3.8–5.2)
RBC # FLD: 13.6 % — SIGNIFICANT CHANGE UP (ref 10.3–14.5)
SODIUM SERPL-SCNC: 137 MMOL/L — SIGNIFICANT CHANGE UP (ref 135–145)
WBC # BLD: 8.49 K/UL — SIGNIFICANT CHANGE UP (ref 3.8–10.5)
WBC # FLD AUTO: 8.49 K/UL — SIGNIFICANT CHANGE UP (ref 3.8–10.5)

## 2024-07-11 PROCEDURE — 99285 EMERGENCY DEPT VISIT HI MDM: CPT | Mod: 25

## 2024-07-11 PROCEDURE — 93005 ELECTROCARDIOGRAM TRACING: CPT

## 2024-07-11 PROCEDURE — 85025 COMPLETE CBC W/AUTO DIFF WBC: CPT

## 2024-07-11 PROCEDURE — 83690 ASSAY OF LIPASE: CPT

## 2024-07-11 PROCEDURE — 76705 ECHO EXAM OF ABDOMEN: CPT | Mod: 26

## 2024-07-11 PROCEDURE — 85610 PROTHROMBIN TIME: CPT

## 2024-07-11 PROCEDURE — 99285 EMERGENCY DEPT VISIT HI MDM: CPT

## 2024-07-11 PROCEDURE — 80053 COMPREHEN METABOLIC PANEL: CPT

## 2024-07-11 PROCEDURE — 96375 TX/PRO/DX INJ NEW DRUG ADDON: CPT

## 2024-07-11 PROCEDURE — 74177 CT ABD & PELVIS W/CONTRAST: CPT | Mod: MC

## 2024-07-11 PROCEDURE — 85730 THROMBOPLASTIN TIME PARTIAL: CPT

## 2024-07-11 PROCEDURE — 76705 ECHO EXAM OF ABDOMEN: CPT

## 2024-07-11 PROCEDURE — 74177 CT ABD & PELVIS W/CONTRAST: CPT | Mod: 26,MC

## 2024-07-11 PROCEDURE — 84702 CHORIONIC GONADOTROPIN TEST: CPT

## 2024-07-11 PROCEDURE — 96374 THER/PROPH/DIAG INJ IV PUSH: CPT | Mod: XU

## 2024-07-11 RX ORDER — ONDANSETRON HCL/PF 4 MG/2 ML
4 VIAL (ML) INJECTION ONCE
Refills: 0 | Status: COMPLETED | OUTPATIENT
Start: 2024-07-11 | End: 2024-07-11

## 2024-07-11 RX ADMIN — Medication 20 MILLIGRAM(S): at 04:29

## 2024-07-11 RX ADMIN — Medication 4 MILLIGRAM(S): at 04:29

## 2024-07-24 ENCOUNTER — APPOINTMENT (OUTPATIENT)
Dept: SURGERY | Facility: CLINIC | Age: 34
End: 2024-07-24
Payer: MEDICAID

## 2024-07-24 VITALS
RESPIRATION RATE: 17 BRPM | TEMPERATURE: 98 F | WEIGHT: 201 LBS | OXYGEN SATURATION: 98 % | DIASTOLIC BLOOD PRESSURE: 72 MMHG | SYSTOLIC BLOOD PRESSURE: 107 MMHG | BODY MASS INDEX: 33.49 KG/M2 | HEIGHT: 65 IN | HEART RATE: 80 BPM

## 2024-07-24 DIAGNOSIS — K80.50 CALCULUS OF BILE DUCT W/OUT CHOLANGITIS OR CHOLECYSTITIS W/OUT OBSTRUCTION: ICD-10-CM

## 2024-07-24 PROCEDURE — 99203 OFFICE O/P NEW LOW 30 MIN: CPT

## 2024-07-24 RX ORDER — FAMOTIDINE 40 MG/1
40 TABLET, FILM COATED ORAL
Refills: 0 | Status: ACTIVE | COMMUNITY

## 2024-07-24 NOTE — PLAN
[FreeTextEntry1] : This patient is suffering from biliary colic.  I have offered her a laparoscopic cholecystectomy.  The procedure as well as risks(including, but not limited to bleeding, infection, injury to hollow viscus, injury to bile ducts), benefits (pain relief), and alternatives were explained to the patient.  Thanks for referring this very pleasant patient to my office

## 2024-07-24 NOTE — PHYSICAL EXAM
[Normal Heart Sounds] : normal heart sounds [No Rash or Lesion] : No rash or lesion [Calm] : calm [de-identified] : No distress [de-identified] : Sclera clear [de-identified] : Supple [de-identified] : Obese soft and nontender.  There are no masses and there is no Rajan's sign [de-identified] : No clubbing cyanosis or edema

## 2024-07-24 NOTE — HISTORY OF PRESENT ILLNESS
[de-identified] : MARCO ANTONIO  is a 34 year  female  here for a consultation for gallbladder. [de-identified] : This 34-year-old patient has known she had cholelithiasis for at least 2 years.  She underwent a sonogram several years ago for abdominal bloating.  Over the last several months she has developed epigastric pain radiating to the right back and right shoulder.  She occasionally has nausea with these painful episodes.  She complains of fatty food intolerance.  Her painful episodes characteristically last several hours then subside spontaneously

## 2024-07-24 NOTE — CONSULT LETTER
[Dear  ___] : Dear  [unfilled], [Consult Letter:] : I had the pleasure of evaluating your patient, [unfilled]. [Please see my note below.] : Please see my note below. [Consult Closing:] : Thank you very much for allowing me to participate in the care of this patient.  If you have any questions, please do not hesitate to contact me. [Sincerely,] : Sincerely, [FreeTextEntry3] : Charbel Xavier MD, FACS [facility] [address]  [place]  [address] Tel: [phone] Email: [email or web address]  [DrHaresh  ___] : Dr. HAMITLON

## 2024-07-24 NOTE — PHYSICAL EXAM
[Normal Heart Sounds] : normal heart sounds [No Rash or Lesion] : No rash or lesion [Calm] : calm [de-identified] : No distress [de-identified] : Sclera clear [de-identified] : Supple [de-identified] : Obese soft and nontender.  There are no masses and there is no Rajan's sign [de-identified] : No clubbing cyanosis or edema

## 2024-07-24 NOTE — HISTORY OF PRESENT ILLNESS
[de-identified] : MARCO ANTONIO  is a 34 year  female  here for a consultation for gallbladder. [de-identified] : This 34-year-old patient has known she had cholelithiasis for at least 2 years.  She underwent a sonogram several years ago for abdominal bloating.  Over the last several months she has developed epigastric pain radiating to the right back and right shoulder.  She occasionally has nausea with these painful episodes.  She complains of fatty food intolerance.  Her painful episodes characteristically last several hours then subside spontaneously

## 2024-07-24 NOTE — CONSULT LETTER
[Dear  ___] : Dear  [unfilled], [Consult Letter:] : I had the pleasure of evaluating your patient, [unfilled]. [Please see my note below.] : Please see my note below. [Consult Closing:] : Thank you very much for allowing me to participate in the care of this patient.  If you have any questions, please do not hesitate to contact me. [Sincerely,] : Sincerely, [FreeTextEntry3] : Charbel Xavier MD, FACS [facility] [address]  [place]  [address] Tel: [phone] Email: [email or web address]  [DrHaresh  ___] : Dr. HAMILTON

## 2024-07-24 NOTE — PHYSICAL EXAM
[Normal Heart Sounds] : normal heart sounds [No Rash or Lesion] : No rash or lesion [Calm] : calm [de-identified] : No distress [de-identified] : Sclera clear [de-identified] : Supple [de-identified] : Obese soft and nontender.  There are no masses and there is no Rajan's sign [de-identified] : No clubbing cyanosis or edema

## 2024-07-24 NOTE — CONSULT LETTER
[Dear  ___] : Dear  [unfilled], [Consult Letter:] : I had the pleasure of evaluating your patient, [unfilled]. [Please see my note below.] : Please see my note below. [Consult Closing:] : Thank you very much for allowing me to participate in the care of this patient.  If you have any questions, please do not hesitate to contact me. [Sincerely,] : Sincerely, [FreeTextEntry3] : Charbel Xavier MD, FACS [facility] [address]  [place]  [address] Tel: [phone] Email: [email or web address]  [DrHaresh  ___] : Dr. HAMILTON

## 2024-07-24 NOTE — HISTORY OF PRESENT ILLNESS
[de-identified] : MARCO ANTONIO  is a 34 year  female  here for a consultation for gallbladder. [de-identified] : This 34-year-old patient has known she had cholelithiasis for at least 2 years.  She underwent a sonogram several years ago for abdominal bloating.  Over the last several months she has developed epigastric pain radiating to the right back and right shoulder.  She occasionally has nausea with these painful episodes.  She complains of fatty food intolerance.  Her painful episodes characteristically last several hours then subside spontaneously

## 2024-07-24 NOTE — CONSULT LETTER
[Dear  ___] : Dear  [unfilled], [Consult Letter:] : I had the pleasure of evaluating your patient, [unfilled]. [Please see my note below.] : Please see my note below. [Consult Closing:] : Thank you very much for allowing me to participate in the care of this patient.  If you have any questions, please do not hesitate to contact me. [Sincerely,] : Sincerely, [FreeTextEntry3] : Charbel Xavier MD, FACS [facility] [address]  [place]  [address] Tel: [phone] Email: [email or web address]  [DrHaresh  ___] : Dr. HAMILTON

## 2024-07-24 NOTE — PHYSICAL EXAM
[Normal Heart Sounds] : normal heart sounds [No Rash or Lesion] : No rash or lesion [Calm] : calm [de-identified] : No distress [de-identified] : Sclera clear [de-identified] : Supple [de-identified] : Obese soft and nontender.  There are no masses and there is no Rajan's sign [de-identified] : No clubbing cyanosis or edema

## 2024-07-24 NOTE — DATA REVIEWED
[FreeTextEntry1] : I reviewed the CAT scan dated July 11 which was consistent with a normal-appearing gallbladder.  A sonogram performed on the same day revealed gallstones, polyps, and a 2 mm common bile duct.  I also reviewed blood work from the same day eluding a CBC, and hCG which is negative, and a normal lipase

## 2024-07-24 NOTE — HISTORY OF PRESENT ILLNESS
[de-identified] : MARCO ANTONIO  is a 34 year  female  here for a consultation for gallbladder. [de-identified] : This 34-year-old patient has known she had cholelithiasis for at least 2 years.  She underwent a sonogram several years ago for abdominal bloating.  Over the last several months she has developed epigastric pain radiating to the right back and right shoulder.  She occasionally has nausea with these painful episodes.  She complains of fatty food intolerance.  Her painful episodes characteristically last several hours then subside spontaneously

## 2024-09-07 ENCOUNTER — NON-APPOINTMENT (OUTPATIENT)
Age: 34
End: 2024-09-07

## 2024-09-24 ENCOUNTER — NON-APPOINTMENT (OUTPATIENT)
Age: 34
End: 2024-09-24

## 2024-10-18 ENCOUNTER — NON-APPOINTMENT (OUTPATIENT)
Age: 34
End: 2024-10-18

## 2025-02-21 NOTE — ED ADULT NURSE REASSESSMENT NOTE - NIH STROKE SCALE: 6A. MOTOR LEG, LEFT, QM
[de-identified] : 02/21/2025: NSR, normal axis, normal intervals, (-) ST-T wave changes. =======================================================
(0) No drift; leg holds 30 degree position for full 5 secs
(0) No drift; leg holds 30 degree position for full 5 secs

## 2025-04-29 ENCOUNTER — NON-APPOINTMENT (OUTPATIENT)
Age: 35
End: 2025-04-29

## 2025-05-09 ENCOUNTER — APPOINTMENT (OUTPATIENT)
Dept: SURGERY | Facility: CLINIC | Age: 35
End: 2025-05-09
Payer: MEDICAID

## 2025-05-09 ENCOUNTER — NON-APPOINTMENT (OUTPATIENT)
Age: 35
End: 2025-05-09

## 2025-05-09 VITALS
TEMPERATURE: 98.2 F | HEART RATE: 90 BPM | DIASTOLIC BLOOD PRESSURE: 69 MMHG | BODY MASS INDEX: 33.99 KG/M2 | WEIGHT: 204 LBS | SYSTOLIC BLOOD PRESSURE: 101 MMHG | OXYGEN SATURATION: 99 % | HEIGHT: 65 IN

## 2025-05-09 PROCEDURE — 99215 OFFICE O/P EST HI 40 MIN: CPT

## 2025-07-09 ENCOUNTER — NON-APPOINTMENT (OUTPATIENT)
Age: 35
End: 2025-07-09

## 2025-09-17 ENCOUNTER — NON-APPOINTMENT (OUTPATIENT)
Age: 35
End: 2025-09-17

## 2025-09-20 ENCOUNTER — NON-APPOINTMENT (OUTPATIENT)
Age: 35
End: 2025-09-20